# Patient Record
Sex: MALE | Race: WHITE | NOT HISPANIC OR LATINO | Employment: OTHER | URBAN - METROPOLITAN AREA
[De-identification: names, ages, dates, MRNs, and addresses within clinical notes are randomized per-mention and may not be internally consistent; named-entity substitution may affect disease eponyms.]

---

## 2021-03-18 ENCOUNTER — OFFICE VISIT (OUTPATIENT)
Dept: NEUROLOGY | Facility: CLINIC | Age: 46
End: 2021-03-18
Payer: MEDICARE

## 2021-03-18 VITALS
RESPIRATION RATE: 16 BRPM | SYSTOLIC BLOOD PRESSURE: 132 MMHG | WEIGHT: 153.25 LBS | BODY MASS INDEX: 24.63 KG/M2 | HEIGHT: 66 IN | HEART RATE: 84 BPM | DIASTOLIC BLOOD PRESSURE: 84 MMHG | TEMPERATURE: 99 F

## 2021-03-18 DIAGNOSIS — G40.909 SEIZURE DISORDER: Primary | ICD-10-CM

## 2021-03-18 DIAGNOSIS — F31.9 BIPOLAR AFFECTIVE DISORDER, REMISSION STATUS UNSPECIFIED: ICD-10-CM

## 2021-03-18 DIAGNOSIS — Q04.6 PORENCEPHALIC CYST: ICD-10-CM

## 2021-03-18 DIAGNOSIS — F81.9 COGNITIVE DEVELOPMENTAL DELAY: ICD-10-CM

## 2021-03-18 PROCEDURE — 99204 OFFICE O/P NEW MOD 45 MIN: CPT | Mod: S$PBB | Performed by: PSYCHIATRY & NEUROLOGY

## 2021-03-18 PROCEDURE — 99999 PR STA SHADOW: CPT | Mod: PBBFAC,,, | Performed by: PSYCHIATRY & NEUROLOGY

## 2021-03-18 PROCEDURE — 99999 PR PBB SHADOW E&M-NEW PATIENT-LVL III: ICD-10-PCS | Mod: PBBFAC,,, | Performed by: PSYCHIATRY & NEUROLOGY

## 2021-03-18 PROCEDURE — 99203 OFFICE O/P NEW LOW 30 MIN: CPT | Mod: PBBFAC | Performed by: PSYCHIATRY & NEUROLOGY

## 2021-03-18 PROCEDURE — 99999 PR PBB SHADOW E&M-NEW PATIENT-LVL III: CPT | Mod: PBBFAC,,, | Performed by: PSYCHIATRY & NEUROLOGY

## 2021-03-18 RX ORDER — LORATADINE 10 MG/1
10 TABLET ORAL DAILY PRN
COMMUNITY
End: 2022-09-08

## 2021-03-18 RX ORDER — OLMESARTAN MEDOXOMIL 20 MG/1
20 TABLET ORAL DAILY
COMMUNITY
End: 2022-09-08 | Stop reason: SDUPTHER

## 2021-03-18 RX ORDER — PANTOPRAZOLE SODIUM 40 MG/1
40 TABLET, DELAYED RELEASE ORAL DAILY
COMMUNITY
End: 2022-09-08

## 2021-03-18 RX ORDER — FLUTICASONE PROPIONATE 50 MCG
1 SPRAY, SUSPENSION (ML) NASAL DAILY PRN
COMMUNITY
End: 2022-09-08

## 2021-03-18 RX ORDER — OLANZAPINE 7.5 MG/1
7.5 TABLET ORAL NIGHTLY
COMMUNITY

## 2021-03-18 RX ORDER — PROPRANOLOL HYDROCHLORIDE 10 MG/1
10 TABLET ORAL 2 TIMES DAILY
COMMUNITY

## 2021-03-18 RX ORDER — DIVALPROEX SODIUM 250 MG/1
250 TABLET, FILM COATED, EXTENDED RELEASE ORAL EVERY 12 HOURS
Qty: 180 TABLET | Refills: 3 | Status: SHIPPED | OUTPATIENT
Start: 2021-03-18 | End: 2022-09-08 | Stop reason: SDUPTHER

## 2021-03-18 RX ORDER — ONDANSETRON 4 MG/1
4 TABLET, ORALLY DISINTEGRATING ORAL EVERY 6 HOURS PRN
COMMUNITY
End: 2022-09-08

## 2022-08-09 ENCOUNTER — TELEPHONE (OUTPATIENT)
Dept: NEUROLOGY | Facility: CLINIC | Age: 47
End: 2022-08-09
Payer: MEDICARE

## 2022-08-09 NOTE — TELEPHONE ENCOUNTER
Patient has been scheduled with DAIN Azevedo for Sept 8,2022 and caregiver has verbalize understanding.

## 2022-08-09 NOTE — TELEPHONE ENCOUNTER
----- Message from Queenie Sands sent at 8/9/2022  3:39 PM CDT -----  Contact: Xena [phone]  Type:  Sooner Appointment Request    Caller is requesting a sooner appointment.  Caller declined first available appointment listed below.  Caller will not accept being placed on the waitlist and is requesting a message be sent to doctor.    Name of Caller: Xena  When is the first available appointment? 3/1/23  Reason for Visit: Epilepsy  Would the patient rather a call back or a response via [facility]? Phone call  Best Call Back Number: [phone]  Additional Information: Patient is temporarily staying with [facility] and looking to continue his neurology care with a provider in [place]. OK with either [facility] or [facility].

## 2022-08-09 NOTE — TELEPHONE ENCOUNTER
----- Message from Queenie Sands sent at 8/9/2022  3:39 PM CDT -----  Contact: Xena [phone]  Type:  Sooner Appointment Request    Caller is requesting a sooner appointment.  Caller declined first available appointment listed below.  Caller will not accept being placed on the waitlist and is requesting a message be sent to doctor.    Name of Caller: Xena  When is the first available appointment? 3/1/23  Reason for Visit: Epilepsy  Would the patient rather a call back or a response via [facility]? Phone call  Best Call Back Number: [phone]  Additional Information: Patient is temporarily staying with [facility] and looking to continue his neurology care with a provider in [place]. OK with either [facility] or [facility].

## 2022-09-07 NOTE — PROGRESS NOTES
Subjective:      Patient ID: Claude Anthony Sarchet is a 46 y.o. male.    Chief Complaint:  Seizures (//)      History of Present Illness  HPI    Former patient of Dr. Sanders, new to me.    Patient presents to appointment with his caretaker, Xena. Patient moved to a [facility] group home in [place] in February 2022. The purpose of this appointment is to established care with a neurologist in [place] for epilepsy while he is staying here temporarily. Xena knows very limited information on the patient's medical history; therefore, the phillip of the HPI is from EMR. Patient's medical history includes cognitive developmental delay, seizure disorder, porencephalic cyst, mood disorder, bipolar, and pancreatitis in 2015. Patient's baseline is oriented to self. He is able to completed ADLs with assistance. It is unclear when patient had his first or last seizure. Per chart review, patient had a possible seizure on 4- described as a 30 minute unresponsive episode. Per Dr. Sanders's note, patient has been seizure free for years. Xena states she is unaware of patient having a seizure since moving into the group home. He is currently taking Depakote  mg BID for epilepsy and behavioral problems. She denies him having adverse effects or tremors. Sees psych for mood disorder and bipolar. Does not drink alcohol. No known history of head injury or stroke. On 4-, MRI brain shows central white matter loss with prominence of the ventricular system. The anterior horn of the left ventricle enlarges into a porencephalic cyst, unchanged compared to head CT 8/2013. Saw neurosurgery who did not recommend surgical intervention. 3-3-2021 VPA 22L.      Review of Systems  Review of Systems   Unable to perform ROS: Psychiatric disorder   Objective:   VITAL SIGNS WERE REVIEWED        GENERAL APPEARANCE:      The patient looks comfortable.     BMI 19.91     No signs of respiratory distress.     Normal  breathing pattern.     No dysmorphic features     No eye contact.      GENERAL MEDICAL EXAM:     HEENT:  Head is atraumatic normocephalic.      Neck and Axillae: No JVD. No visible lesions. No thyromegaly. No lymphadenopathy.     Cardiopulmonary: No cyanosis. No tachypnea. Normal respiratory effort.     Gastrointestinal/Urogenital:  No jaundice. No stomas or lesions. No visible hernias. No catheters.     Skin, Hair and Nails:  No neurofibromatosis. No visible lesions.No stigmata of autoimmune disease. No clubbing.     Skin is warm and moist. No palpable masses.     Limbs: No varicose veins. No visible swelling. No palpable edema.     Muskoskeletal: No visible deformities.No visible lesions.     No spine tenderness. No signs of longstanding neuropathy. No dislocations or fractures.          Neurologic Exam     Mental Status   Disoriented to person: oriented first name only.   Disoriented to place.   Disoriented to time.   Follows 1 step commands.   Attention: decreased. Concentration: decreased.   Speech: speech is normal   Level of consciousness: alert    Cranial Nerves     CN II   Visual acuity: decreased    CN III, IV, VI   Pupils are equal, round, and reactive to light.  Right pupil: Size: 3 mm. Shape: regular. Reactivity: brisk. Consensual response: intact. Accommodation: intact.   Left pupil: Size: 3 mm. Shape: regular. Reactivity: brisk. Consensual response: intact. Accommodation: intact.   CN III: no CN III palsy  CN VI: no CN VI palsy  Nystagmus: none   Diplopia: none  Abnormal upgaze: Right eye upward deviation.  Downgaze: normal    CN V   Facial sensation intact.   Right facial sensation deficit: none  Left facial sensation deficit: none    CN VII   Facial expression full, symmetric.   Right facial weakness: none  Left facial weakness: none    CN VIII   CN VIII normal.   Hearing: intact    CN IX, X   CN IX normal.   CN X normal.   Palate: symmetric    CN XI   CN XI normal.   Right sternocleidomastoid  strength: normal  Left sternocleidomastoid strength: normal  Right trapezius strength: normal  Left trapezius strength: normal    CN XII   CN XII normal.   Tongue: not atrophic  Fasciculations: absent  Tongue deviation: none    Motor Exam   Muscle bulk: normal  Overall muscle tone: normal  Right arm tone: normal  Left arm tone: normal  Right arm pronator drift: absent  Left arm pronator drift: absent  Right leg tone: normal  Left leg tone: normal  Had difficulty following commands. Appears to move all extremities equally and strong      Sensory Exam     Unable to assess     Gait, Coordination, and Reflexes     Gait  Gait: normal    Coordination   Finger to nose coordination: normal    Tremor   Resting tremor: absent  Intention tremor: absent  Action tremor: left arm and right arm    Reflexes   Right brachioradialis: 2+  Left brachioradialis: 2+  Right biceps: 2+  Left biceps: 2+  Right triceps: 2+  Left triceps: 2+  Right patellar: 2+  Left patellar: 2+  Right achilles: 2+  Left achilles: 2+  Right plantar: normal  Left plantar: normal  Right Bright: absent  Left Bright: absent  Right ankle clonus: absent  Left ankle clonus: absent  Right pendular knee jerk: absent  Left pendular knee jerk: absent        Lab Results   Component Value Date    WBC 6.42 04/13/2016    HGB 13.4 (L) 04/13/2016    HCT 40.5 04/13/2016    MCV 91 04/13/2016     (L) 04/13/2016       Sodium   Date Value Ref Range Status   04/13/2016 142 136 - 145 mmol/L Final     Potassium   Date Value Ref Range Status   04/13/2016 3.8 3.5 - 5.1 mmol/L Final     Chloride   Date Value Ref Range Status   04/13/2016 108 95 - 110 mmol/L Final     CO2   Date Value Ref Range Status   04/13/2016 27 23 - 29 mmol/L Final     Glucose   Date Value Ref Range Status   04/13/2016 91 70 - 110 mg/dL Final     BUN   Date Value Ref Range Status   04/13/2016 17 6 - 20 mg/dL Final     Creatinine   Date Value Ref Range Status   04/13/2016 1.0 0.5 - 1.4 mg/dL Final      Calcium   Date Value Ref Range Status   04/13/2016 9.6 8.7 - 10.5 mg/dL Final     Total Protein   Date Value Ref Range Status   04/12/2016 6.9 6.0 - 8.4 g/dL Final     Albumin   Date Value Ref Range Status   04/12/2016 4.3 3.5 - 5.2 g/dL Final     Total Bilirubin   Date Value Ref Range Status   04/12/2016 0.5 0.1 - 1.0 mg/dL Final     Comment:     For infants and newborns, interpretation of results should be based  on gestational age, weight and in agreement with clinical  observations.  Premature Infant recommended reference ranges:  Up to 24 hours.............<8.0 mg/dL  Up to 48 hours............<12.0 mg/dL  3-5 days..................<15.0 mg/dL  6-29 days.................<15.0 mg/dL       Alkaline Phosphatase   Date Value Ref Range Status   04/12/2016 60 55 - 135 U/L Final     AST   Date Value Ref Range Status   04/12/2016 22 10 - 40 U/L Final     ALT   Date Value Ref Range Status   04/12/2016 25 10 - 44 U/L Final     Anion Gap   Date Value Ref Range Status   04/13/2016 7 (L) 8 - 16 mmol/L Final     eGFR if    Date Value Ref Range Status   04/13/2016 >60.0 >60 mL/min/1.73 m^2 Final     eGFR if non    Date Value Ref Range Status   04/13/2016 >60.0 >60 mL/min/1.73 m^2 Final     Comment:     Calculation used to obtain the estimated glomerular filtration  rate (eGFR) is the CKD-EPI equation. Since race is unknown   in our information system, the eGFR values for   -American and Non--American patients are given   for each creatinine result.       AED MONITORING    AED: Valproic acid RANGE:  Dose    DATE LEVEL    3-3-2021 22.7L 250 mg BID ER                                      08-    CTH shows a cystic structure emanating off the anterior horn of the left lateral ventricle. There is ventricular dilation. Otherwise, this is a normal CT of the brain    04-    MRI brain shows central white matter loss with prominence of the ventricular system. The  anterior horn of the left ventricle enlarges into a porencephalic cyst, unchanged compared to head CT 8/2013 09-    EEG Non-epileptiform, Technically Difficult         Reviewed the neuroimaging independently      Assessment:     1. Seizure disorder    2. Porencephalic cyst    3. Cognitive developmental delay    4. Bipolar affective disorder, remission status unspecified    5. Mood disorder         EPILEPSY CLASSIFICATION        SEMIOLOGY: UNKOWN     EPILEPTOGENIC ZONE (S): UNKNOWN      ETIOLOGY: PORENCEPHALIC CYST     PRIOR AEDS:  UNKOWN    CURRENT AEDS: VPA     LAST SEIZURE DATE: UNKNOWN        COMPREHENSIVE LIST OF AEDs:      Acetazolamide (AZM-Diamox)   Benzos: clonazepam (CZP Klonopin), lorazepam (LZP-Ativan), diazepam (DZP-Valium), clorazepate (CLZ- Tranxene)  Brivaracetam (BRV-Briviact)  Cannabidiol (CBD- Epidiolex)  Carbamazepine (CBZ-Tegretol)  Cenobamate (CNB-Xcopri)  Clobazam (CLB-Onfi)  Eslicarbazepine (ESL-Aptiom)  Ethosuximide (ESX-Zarontin)  Felbamate (FBM-Felbatol)  Gabapentin (GBP-Neurontin)  Lacosamide (LCM-Vimpat)  Lamotrigine (LTG-Lamitcal)  Levetiracetam (LEV- Keppra)  Oxcarbazepine (OXC-Trileptal)  Perampanel (PML-Fycompa)  Phenobarbital (PB)  Phenytoin (PHT-Dilantin)  Pregabalin (PGB-Lyrica)  Primidone (PRM)   Retigabine (RTG- Potiga) Discontinued in 2017  Rufinamide (RFN-Benzil)  Stiripentol (STP-Diacomit)  Tiagabine (TGB-Gabitril)  Topiramate (TPM-Topamax)  Valproate (VPA-Depakote)  Vigabatrin (VGB-Sabril)  Zonisamide (ZNS-Zonegran)  Plan:     SEIZURE DISORDER    EEG to evaluate for epileptiform discharges.     Brain MRI    The patient was encouraged to maintain full traditional seizure precautions which include but not limited to avoid driving, avoid high altitudes, avoid being close to fire or fire source, avoid being close to a body of water or swimming alone, avoid operating heavy machinery and avoid using sharp objects if possible. The patient was encouraged to shower  (without accumulation of water) instead of taking a bath if unsupervised. The patient was made aware that these precautions are especially important during concurrent illness. Adequate sleep and avoidance of alcohol as important measures to assure good seizure control were discussed with the patient. The patient was also advised not to care for children without company. The patient was advised to pad the side rails with pillows and blankets if applicable.I strongly recommended lowering the bed to the floor level to decrease the risk of falls.     Continue VPA  mg BID and check level    Check CBC and CMP    Continue AEDs INDEFINITELY, FOR GOOD AND NEVER SKIP A DOSE. The patient verbalized full understanding. Stressed the extreme medical, personal safety, public safety and legal importance of compliance and seizure control. Will give as many refills as possible with or without face-to-face evaluation to make sure the patient and any patient with epilepsy will never run out of medications. Running out of seizure medications should never happen under our care. I explained to the patient that he should not, under any circumstances, adjust or stop taking his seizure medication without discussing with me. The patient verbalized full understanding.       Explained to the patient that if 2 AEDs fail to control the seizures the likelihood of AEDs alone to control the seizures is <10% and other other options should be pursued.    May consider Adjuvant Verapamil and Prednisone in medically-refractory epilepsy.         AVOID any substance that could lower seizure threshold including but not limited to:      ALCOHOL AND WITHDRAWAL      TRAMADOL.     DEMEROL      ALL STIMULANTS-ALL ADHD MEDICATIONS.      CLOZAPINE.      BUPROPION.     CIPROFLOXACIN          SEIZURE FREEDOM DEFINITION: No seizures for 1 year or for 3 times the interval between the last 2 seizures whichever is longer (Some studies suggest 6 times  even)!          PORENCEPHALIC CYST    Evaluate MRI brain for monitoring        BIPLOAR/MOOD DISORDER    Continue to follow psych         MEDICAL/SURGICAL COMORBIDITIES      All relevant medical comorbidities noted and managed by primary care physician and medical care team.          MISCELLANEOUS MEDICAL PROBLEMS         HEALTHY LIFESTYLE AND PREVENTATIVE CARE     Encouraged the patient to adhere to the age-appropriate health maintenance guidelines including screening tests and vaccinations.      Discussed the overall importance of healthy lifestyle, optimal weight, exercise, healthy diet, good sleep hygiene and avoiding drugs including smoking, alcohol and recreational drugs. The patient verbalized full understanding.         Advised the patient to follow COVID-19 prevention measures.         I spent a total of 70 minutes on the day of the visit.  This includes face to face time and non-face to face time preparing to see the patient (eg, review of tests), obtaining and/or reviewing separately obtained history, documenting clinical information in the electronic or other health record, independently interpreting results and communicating results to the patient/family/caregiver, or care coordinator.            RTC    Follow up in about 6 months (around 3/8/2023).     Jolly Tolliver, MSN, NP  Collaborating Provider: Jarrell Kincaid MD, FAAN Neurologist/Epileptologist

## 2022-09-08 ENCOUNTER — OFFICE VISIT (OUTPATIENT)
Dept: NEUROLOGY | Facility: CLINIC | Age: 47
End: 2022-09-08
Payer: MEDICARE

## 2022-09-08 VITALS
WEIGHT: 121.5 LBS | SYSTOLIC BLOOD PRESSURE: 126 MMHG | HEART RATE: 65 BPM | RESPIRATION RATE: 16 BRPM | BODY MASS INDEX: 19.53 KG/M2 | HEIGHT: 66 IN | DIASTOLIC BLOOD PRESSURE: 83 MMHG

## 2022-09-08 DIAGNOSIS — Q04.6 PORENCEPHALIC CYST: ICD-10-CM

## 2022-09-08 DIAGNOSIS — F31.9 BIPOLAR AFFECTIVE DISORDER, REMISSION STATUS UNSPECIFIED: ICD-10-CM

## 2022-09-08 DIAGNOSIS — G40.909 SEIZURE DISORDER: Primary | ICD-10-CM

## 2022-09-08 DIAGNOSIS — F81.9 COGNITIVE DEVELOPMENTAL DELAY: ICD-10-CM

## 2022-09-08 DIAGNOSIS — F39 MOOD DISORDER: ICD-10-CM

## 2022-09-08 PROCEDURE — 99999 PR PBB SHADOW E&M-EST. PATIENT-LVL IV: CPT | Mod: PBBFAC,,, | Performed by: NURSE PRACTITIONER

## 2022-09-08 PROCEDURE — 99215 OFFICE O/P EST HI 40 MIN: CPT | Mod: S$PBB,,, | Performed by: NURSE PRACTITIONER

## 2022-09-08 PROCEDURE — 99999 PR PBB SHADOW E&M-EST. PATIENT-LVL IV: ICD-10-PCS | Mod: PBBFAC,,, | Performed by: NURSE PRACTITIONER

## 2022-09-08 PROCEDURE — 99215 PR OFFICE/OUTPT VISIT, EST, LEVL V, 40-54 MIN: ICD-10-PCS | Mod: S$PBB,,, | Performed by: NURSE PRACTITIONER

## 2022-09-08 PROCEDURE — 99214 OFFICE O/P EST MOD 30 MIN: CPT | Mod: PBBFAC | Performed by: NURSE PRACTITIONER

## 2022-09-08 RX ORDER — DIVALPROEX SODIUM 250 MG/1
250 TABLET, DELAYED RELEASE ORAL 2 TIMES DAILY
COMMUNITY
Start: 2022-08-18 | End: 2022-09-08 | Stop reason: SDUPTHER

## 2022-09-08 RX ORDER — OLANZAPINE 7.5 MG/1
1 TABLET ORAL DAILY
COMMUNITY
Start: 2022-03-21 | End: 2022-09-08 | Stop reason: SDUPTHER

## 2022-09-08 RX ORDER — FENOFIBRATE 145 MG/1
1 TABLET, FILM COATED ORAL DAILY
COMMUNITY
Start: 2022-07-26

## 2022-09-08 RX ORDER — ACETAMINOPHEN 325 MG/1
325 TABLET ORAL
COMMUNITY

## 2022-09-08 RX ORDER — PROPRANOLOL HYDROCHLORIDE 10 MG/1
1 TABLET ORAL DAILY
COMMUNITY
Start: 2022-07-26 | End: 2022-09-08 | Stop reason: SDUPTHER

## 2022-09-08 RX ORDER — DIVALPROEX SODIUM 250 MG/1
250 TABLET, FILM COATED, EXTENDED RELEASE ORAL EVERY 12 HOURS
Qty: 180 TABLET | Refills: 3 | Status: SHIPPED | OUTPATIENT
Start: 2022-09-08 | End: 2023-07-14 | Stop reason: SDUPTHER

## 2022-09-08 RX ORDER — OLMESARTAN MEDOXOMIL 20 MG/1
1 TABLET ORAL DAILY
COMMUNITY
Start: 2022-07-26

## 2022-09-08 RX ORDER — ADHESIVE BANDAGE
30 BANDAGE TOPICAL
COMMUNITY

## 2022-09-14 LAB
ALBUMIN SERPL-MCNC: 4.1 G/DL (ref 4–5)
ALBUMIN/GLOB SERPL: 2.3 {RATIO} (ref 1.2–2.2)
ALP SERPL-CCNC: 56 IU/L (ref 44–121)
ALT SERPL-CCNC: 27 IU/L (ref 0–44)
AST SERPL-CCNC: 28 IU/L (ref 0–40)
BASOPHILS # BLD AUTO: 0 X10E3/UL (ref 0–0.2)
BASOPHILS NFR BLD AUTO: 0 %
BILIRUB SERPL-MCNC: 0.5 MG/DL (ref 0–1.2)
BUN SERPL-MCNC: 16 MG/DL (ref 6–24)
BUN/CREAT SERPL: 17 (ref 9–20)
CALCIUM SERPL-MCNC: 9.6 MG/DL (ref 8.7–10.2)
CHLORIDE SERPL-SCNC: 110 MMOL/L (ref 96–106)
CO2 SERPL-SCNC: 23 MMOL/L (ref 20–29)
CREAT SERPL-MCNC: 0.92 MG/DL (ref 0.76–1.27)
EOSINOPHIL # BLD AUTO: 0.1 X10E3/UL (ref 0–0.4)
EOSINOPHIL NFR BLD AUTO: 1 %
ERYTHROCYTE [DISTWIDTH] IN BLOOD BY AUTOMATED COUNT: 13.1 % (ref 11.6–15.4)
EST. GFR  (NO RACE VARIABLE): 105 ML/MIN/1.73
GLOBULIN SER CALC-MCNC: 1.8 G/DL (ref 1.5–4.5)
GLUCOSE SERPL-MCNC: 120 MG/DL (ref 65–99)
HCT VFR BLD AUTO: 34.9 % (ref 37.5–51)
HGB BLD-MCNC: 11.6 G/DL (ref 13–17.7)
IMM GRANULOCYTES # BLD AUTO: 0 X10E3/UL (ref 0–0.1)
IMM GRANULOCYTES NFR BLD AUTO: 1 %
LYMPHOCYTES # BLD AUTO: 1.2 X10E3/UL (ref 0.7–3.1)
LYMPHOCYTES NFR BLD AUTO: 33 %
MCH RBC QN AUTO: 30.2 PG (ref 26.6–33)
MCHC RBC AUTO-ENTMCNC: 33.2 G/DL (ref 31.5–35.7)
MCV RBC AUTO: 91 FL (ref 79–97)
MONOCYTES # BLD AUTO: 0.2 X10E3/UL (ref 0.1–0.9)
MONOCYTES NFR BLD AUTO: 6 %
NEUTROPHILS # BLD AUTO: 2.1 X10E3/UL (ref 1.4–7)
NEUTROPHILS NFR BLD AUTO: 59 %
PLATELET # BLD AUTO: 182 X10E3/UL (ref 150–450)
POTASSIUM SERPL-SCNC: 3.8 MMOL/L (ref 3.5–5.2)
PROT SERPL-MCNC: 5.9 G/DL (ref 6–8.5)
RBC # BLD AUTO: 3.84 X10E6/UL (ref 4.14–5.8)
SODIUM SERPL-SCNC: 147 MMOL/L (ref 134–144)
VALPROATE SERPL-MCNC: 50 UG/ML (ref 50–100)
WBC # BLD AUTO: 3.5 X10E3/UL (ref 3.4–10.8)

## 2022-09-28 ENCOUNTER — HOSPITAL ENCOUNTER (OUTPATIENT)
Dept: PULMONOLOGY | Facility: HOSPITAL | Age: 47
Discharge: HOME | End: 2022-09-28
Attending: STUDENT IN AN ORGANIZED HEALTH CARE EDUCATION/TRAINING PROGRAM
Payer: MEDICARE

## 2022-09-28 DIAGNOSIS — G40.909 SEIZURE DISORDER: ICD-10-CM

## 2022-09-28 PROCEDURE — 95819 PR EEG,W/AWAKE & ASLEEP RECORD: ICD-10-PCS | Mod: 26,,, | Performed by: PSYCHIATRY & NEUROLOGY

## 2022-09-28 PROCEDURE — 95816 EEG AWAKE AND DROWSY: CPT

## 2022-09-28 PROCEDURE — 95819 EEG AWAKE AND ASLEEP: CPT | Mod: 26,,, | Performed by: PSYCHIATRY & NEUROLOGY

## 2022-09-28 NOTE — PROCEDURES
DATE EEG PERFORMED:  2022.      DATE EEG INTERPRETED: 2022.                     DURATION OF EE MINUTES.        LEVEL OF CONSCIOUSENESS    Awake and Sleep.         EEG BACKGROUND    The posterior dominant basic rhythm cannot be evaluated.    Abundant EMG and eye movement artifacts.         EEG CLASSIFICATION    Technically Difficult         IMPRESSION      The EEG is technically difficult.       There are no epileptiform discharges or lateralizing signs. No typical events were recorded. There is no electrographic evidence of seizure.There is no electrographic evidence of status epilepticus.         PLEASE NOTE THAT A NON-EPILEPTIFORM EEG DOES NOT RULE OUT EPILEPSY.        JORDEN BARRAZA MD, FAAN    Diplomate, American Board of Psychiatry and Neurology    Diplomate, American Board of Clinical Neurophysiology

## 2022-09-29 ENCOUNTER — TELEPHONE (OUTPATIENT)
Dept: NEUROLOGY | Facility: CLINIC | Age: 47
End: 2022-09-29
Payer: MEDICARE

## 2022-09-29 NOTE — PROGRESS NOTES
EEG Reviewed    9-    The EEG is technically difficult. Abundant movement artifact. No epileptiform discharges seen.

## 2022-10-04 ENCOUNTER — HOSPITAL ENCOUNTER (OUTPATIENT)
Dept: RADIOLOGY | Facility: HOSPITAL | Age: 47
Discharge: HOME | End: 2022-10-04
Attending: NURSE PRACTITIONER
Payer: MEDICARE

## 2022-10-04 DIAGNOSIS — G40.909 SEIZURE DISORDER: ICD-10-CM

## 2022-10-04 DIAGNOSIS — Q04.6 PORENCEPHALIC CYST: ICD-10-CM

## 2022-11-30 ENCOUNTER — HOSPITAL ENCOUNTER (OUTPATIENT)
Dept: RADIOLOGY | Facility: HOSPITAL | Age: 47
Discharge: HOME | End: 2022-11-30
Attending: NURSE PRACTITIONER
Payer: MEDICARE

## 2023-02-17 ENCOUNTER — TELEPHONE (OUTPATIENT)
Dept: NEUROLOGY | Facility: CLINIC | Age: 48
End: 2023-02-17
Payer: MEDICARE

## 2023-02-22 ENCOUNTER — TELEPHONE (OUTPATIENT)
Dept: NEUROLOGY | Facility: CLINIC | Age: 48
End: 2023-02-22
Payer: MEDICARE

## 2023-05-09 ENCOUNTER — OFFICE VISIT (OUTPATIENT)
Dept: NEUROLOGY | Facility: CLINIC | Age: 48
End: 2023-05-09
Payer: MEDICARE

## 2023-05-09 ENCOUNTER — LAB VISIT (OUTPATIENT)
Dept: LAB | Facility: HOSPITAL | Age: 48
End: 2023-05-09
Attending: PHYSICIAN ASSISTANT
Payer: MEDICARE

## 2023-05-09 VITALS
SYSTOLIC BLOOD PRESSURE: 112 MMHG | BODY MASS INDEX: 19.98 KG/M2 | WEIGHT: 124.31 LBS | DIASTOLIC BLOOD PRESSURE: 70 MMHG | HEIGHT: 66 IN | RESPIRATION RATE: 16 BRPM | HEART RATE: 73 BPM

## 2023-05-09 DIAGNOSIS — F79 MENTALLY CHALLENGED: ICD-10-CM

## 2023-05-09 DIAGNOSIS — Q04.6 PORENCEPHALIC CYST: ICD-10-CM

## 2023-05-09 DIAGNOSIS — G40.909 SEIZURE DISORDER: ICD-10-CM

## 2023-05-09 DIAGNOSIS — G40.909 SEIZURE DISORDER: Primary | ICD-10-CM

## 2023-05-09 DIAGNOSIS — F81.9 COGNITIVE DEVELOPMENTAL DELAY: ICD-10-CM

## 2023-05-09 DIAGNOSIS — Z86.73 HISTORY OF CVA IN ADULTHOOD: ICD-10-CM

## 2023-05-09 DIAGNOSIS — F31.9 BIPOLAR AFFECTIVE DISORDER, REMISSION STATUS UNSPECIFIED: ICD-10-CM

## 2023-05-09 DIAGNOSIS — F39 MOOD DISORDER: ICD-10-CM

## 2023-05-09 LAB — VALPROATE SERPL-MCNC: 23 UG/ML (ref 50–100)

## 2023-05-09 PROCEDURE — 99215 OFFICE O/P EST HI 40 MIN: CPT | Mod: S$PBB,,, | Performed by: NURSE PRACTITIONER

## 2023-05-09 PROCEDURE — 99999 PR PBB SHADOW E&M-EST. PATIENT-LVL IV: ICD-10-PCS | Mod: PBBFAC,,, | Performed by: NURSE PRACTITIONER

## 2023-05-09 PROCEDURE — 99999 PR PBB SHADOW E&M-EST. PATIENT-LVL IV: CPT | Mod: PBBFAC,,, | Performed by: NURSE PRACTITIONER

## 2023-05-09 PROCEDURE — 80164 ASSAY DIPROPYLACETIC ACD TOT: CPT | Performed by: NURSE PRACTITIONER

## 2023-05-09 PROCEDURE — 99215 PR OFFICE/OUTPT VISIT, EST, LEVL V, 40-54 MIN: ICD-10-PCS | Mod: S$PBB,,, | Performed by: NURSE PRACTITIONER

## 2023-05-09 PROCEDURE — 99214 OFFICE O/P EST MOD 30 MIN: CPT | Mod: PBBFAC | Performed by: NURSE PRACTITIONER

## 2023-05-09 PROCEDURE — 36415 COLL VENOUS BLD VENIPUNCTURE: CPT | Performed by: NURSE PRACTITIONER

## 2023-05-09 NOTE — PROGRESS NOTES
Subjective:      Patient ID: Claude Anthony Sarchet is a 48 y.o. male.    Chief Complaint:  Seizure disorder        History of Present Illness  HPI    Former patient of Dr. Sanders, new to me.    Patient presents to appointment with his caretaker, Xena. Patient moved to a [facility] group home in [place] in February 2022. The purpose of this appointment is to established care with a neurologist in [place] for epilepsy while he is staying here temporarily. Xena knows very limited information on the patient's medical history; therefore, the phillip of the HPI is from EMR. Patient's medical history includes cognitive developmental delay, seizure disorder, porencephalic cyst, mood disorder, bipolar, and pancreatitis in 2015. Patient's baseline is oriented to self. He is able to completed ADLs with assistance. It is unclear when patient had his first or last seizure. Per chart review, patient had a possible seizure on 4- described as a 30 minute unresponsive episode. Per Dr. Sanders's note, patient has been seizure free for years. Xena states she is unaware of patient having a seizure since moving into the group home. He is currently taking Depakote  mg BID for epilepsy and behavioral problems. She denies him having adverse effects or tremors. Sees psych for mood disorder and bipolar. Does not drink alcohol. No known history of head injury or stroke. On 4-, MRI brain shows central white matter loss with prominence of the ventricular system. The anterior horn of the left ventricle enlarges into a porencephalic cyst, unchanged compared to head CT 8/2013. Saw neurosurgery who did not recommend surgical intervention. 3-3-2021 VPA 22L.      INTERVAL  HISTORY 05-: Patient is new to me, known to Jolly Tolliver NP. Patient is currently Carries Dx of Seizures, Cognitive developmental delay, Porencephalic cyst, Mood disorder, Bipolar.  Accompanied by caretaker, Viral.  Patient continues to be a resident of [facility]. Patient doing well.  Per chart review, patient had a possible seizure on 4- described as a 30 minute unresponsive episode. There is no mention of repeat occurrences. Viral reports no recent seizures. He has been a resident for the past 2 years with no issues or seizures reported.   He is currently taking Depakote  mg BID for epilepsy and behavioral problems. Patient also take Zyprexa 7.5 mg po daily managed by PCP Dr. Kirkland.     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 He states many times he want to go back to work. He states he enjoy watching TV at work, very kind, very repetitive, independent with dressing himself, he has to have supervision with ADLs such as bathing. The patient able to feed himself, he does light house work, simple instructions understood.      MRI Brain not completed, will order VPA level. will recheck EEG .       Review of Systems  Review of Systems   Unable to perform ROS: Psychiatric disorder     Objective:   VITAL SIGNS WERE REVIEWED        GENERAL APPEARANCE:      The patient looks comfortable.     BMI 19.91     No signs of respiratory distress.     Normal breathing pattern.     No dysmorphic features     No eye contact.      GENERAL MEDICAL EXAM:     HEENT:  Head is atraumatic normocephalic.      Neck and Axillae: No JVD. No visible lesions. No thyromegaly. No lymphadenopathy.     Cardiopulmonary: No cyanosis. No tachypnea. Normal respiratory effort.     Gastrointestinal/Urogenital:  No jaundice. No stomas or lesions. No visible hernias. No catheters.     Skin, Hair and Nails:  No neurofibromatosis. No visible lesions.No stigmata of autoimmune disease. No clubbing.     Skin is warm and moist. No palpable masses.     Limbs: No varicose veins. No visible swelling. No palpable edema.     Muskoskeletal: No visible deformities.No visible lesions.     No spine tenderness. No signs  of longstanding neuropathy. No dislocations or fractures.          Neurologic Exam     Mental Status   Disoriented to person: oriented first name only.   Disoriented to place.   Disoriented to time.   Follows 1 step commands.   Attention: decreased. Concentration: decreased.   Speech: speech is normal   Level of consciousness: alert    Cranial Nerves     CN II   Visual acuity: decreased    CN III, IV, VI   Pupils are equal, round, and reactive to light.  Right pupil: Size: 3 mm. Shape: regular. Reactivity: brisk. Consensual response: intact. Accommodation: intact.   Left pupil: Size: 3 mm. Shape: regular. Reactivity: brisk. Consensual response: intact. Accommodation: intact.   CN III: no CN III palsy  CN VI: no CN VI palsy  Nystagmus: none   Diplopia: none  Abnormal upgaze: Right eye upward deviation.  Downgaze: normal    CN V   Facial sensation intact.   Right facial sensation deficit: none  Left facial sensation deficit: none    CN VII   Facial expression full, symmetric.   Right facial weakness: none  Left facial weakness: none    CN VIII   CN VIII normal.   Hearing: intact    CN IX, X   CN IX normal.   CN X normal.   Palate: symmetric    CN XI   CN XI normal.   Right sternocleidomastoid strength: normal  Left sternocleidomastoid strength: normal  Right trapezius strength: normal  Left trapezius strength: normal    CN XII   CN XII normal.   Tongue: not atrophic  Fasciculations: absent  Tongue deviation: none    Motor Exam   Muscle bulk: normal  Overall muscle tone: normal  Right arm tone: normal  Left arm tone: normal  Right arm pronator drift: absent  Left arm pronator drift: absent  Right leg tone: normal  Left leg tone: normal  Had difficulty following commands. Appears to move all extremities equally and strong      Sensory Exam     Unable to assess     Gait, Coordination, and Reflexes     Gait  Gait: normal    Coordination   Finger to nose coordination: normal    Tremor   Resting tremor: absent  Intention  tremor: absent  Action tremor: left arm and right arm    Reflexes   Right brachioradialis: 2+  Left brachioradialis: 2+  Right biceps: 2+  Left biceps: 2+  Right triceps: 2+  Left triceps: 2+  Right patellar: 2+  Left patellar: 2+  Right achilles: 2+  Left achilles: 2+  Right plantar: normal  Left plantar: normal  Right Bright: absent  Left Bright: absent  Right ankle clonus: absent  Left ankle clonus: absent  Right pendular knee jerk: absent  Left pendular knee jerk: absent          Lab Results   Component Value Date    WBC 4.6 02/16/2023    HGB 11.0 (L) 02/16/2023    HCT 33.8 (L) 02/16/2023    MCV 91 09/13/2022     02/16/2023       Sodium   Date Value Ref Range Status   09/13/2022 147 (H) 134 - 144 mmol/L Final     Potassium   Date Value Ref Range Status   09/13/2022 3.8 3.5 - 5.2 mmol/L Final     Chloride   Date Value Ref Range Status   09/13/2022 110 (H) 96 - 106 mmol/L Final     CO2   Date Value Ref Range Status   09/13/2022 23 20 - 29 mmol/L Final     Glucose   Date Value Ref Range Status   09/13/2022 120 (H) 65 - 99 mg/dL Final     BUN   Date Value Ref Range Status   09/13/2022 16 6 - 24 mg/dL Final     Creatinine   Date Value Ref Range Status   09/13/2022 0.92 0.76 - 1.27 mg/dL Final     Calcium   Date Value Ref Range Status   09/13/2022 9.6 8.7 - 10.2 mg/dL Final     Total Protein   Date Value Ref Range Status   04/12/2016 6.9 6.0 - 8.4 g/dL Final     Albumin   Date Value Ref Range Status   09/13/2022 4.1 4.0 - 5.0 g/dL Final   04/12/2016 4.3 3.5 - 5.2 g/dL Final     Total Bilirubin   Date Value Ref Range Status   09/13/2022 0.5 0.0 - 1.2 mg/dL Final     Alkaline Phosphatase   Date Value Ref Range Status   04/12/2016 60 55 - 135 U/L Final     AST   Date Value Ref Range Status   09/13/2022 28 0 - 40 IU/L Final     ALT   Date Value Ref Range Status   09/13/2022 27 0 - 44 IU/L Final     Anion Gap   Date Value Ref Range Status   04/13/2016 7 (L) 8 - 16 mmol/L Final     eGFR if    Date  Value Ref Range Status   04/13/2016 >60.0 >60 mL/min/1.73 m^2 Final     eGFR if non    Date Value Ref Range Status   04/13/2016 >60.0 >60 mL/min/1.73 m^2 Final     Comment:     Calculation used to obtain the estimated glomerular filtration  rate (eGFR) is the CKD-EPI equation. Since race is unknown   in our information system, the eGFR values for   -American and Non--American patients are given   for each creatinine result.       AED MONITORING    AED: Valproic acid RANGE:  Dose    DATE LEVEL    3-3-2021 22.7L 250 mg BID ER   05- 23.0 L  250 mg BID ER                                 08-    CTH shows a cystic structure emanating off the anterior horn of the left lateral ventricle. There is ventricular dilation. Otherwise, this is a normal CT of the brain    04-    MRI brain shows central white matter loss with prominence of the ventricular system. The anterior horn of the left ventricle enlarges into a porencephalic cyst, unchanged compared to head CT 8/2013 09-    EEG Non-epileptiform, Technically Difficult     CTH WO:     01-    No acute intracranial abnormality.     [place] left basal ganglia lacunar infarction.    Reviewed the neuroimaging independently      Assessment:     1. Seizure disorder    2. Cognitive developmental delay    3. Mentally challenged    4. Porencephalic cyst    5. Bipolar affective disorder, remission status unspecified    6. Mood disorder    7. HISTORY OF LEFT BASAL GANGLIA LACUNAR STROKE           EPILEPSY CLASSIFICATION        SEMIOLOGY: UNKOWN     EPILEPTOGENIC ZONE (S): UNKNOWN      ETIOLOGY: PORENCEPHALIC CYST     PRIOR AEDS:  UNKOWN    CURRENT AEDS: VPA     LAST SEIZURE DATE: UNKNOWN        COMPREHENSIVE LIST OF AEDs:      Acetazolamide (AZM-Diamox)   Benzos: clonazepam (CZP Klonopin), lorazepam (LZP-Ativan), diazepam (DZP-Valium), clorazepate (CLZ- Tranxene)  Brivaracetam (BRV-Briviact)  Cannabidiol (CBD-  Epidiolex)  Carbamazepine (CBZ-Tegretol)  Cenobamate (CNB-Xcopri)  Clobazam (CLB-Onfi)  Eslicarbazepine (ESL-Aptiom)  Ethosuximide (ESX-Zarontin)  Felbamate (FBM-Felbatol)  Gabapentin (GBP-Neurontin)  Lacosamide (LCM-Vimpat)  Lamotrigine (LTG-Lamitcal)  Levetiracetam (LEV- Keppra)  Oxcarbazepine (OXC-Trileptal)  Perampanel (PML-Fycompa)  Phenobarbital (PB)  Phenytoin (PHT-Dilantin)  Pregabalin (PGB-Lyrica)  Primidone (PRM)   Retigabine (RTG- Potiga) Discontinued in 2017  Rufinamide (RFN-Benzil)  Stiripentol (STP-Diacomit)  Tiagabine (TGB-Gabitril)  Topiramate (TPM-Topamax)  Valproate (VPA-Depakote)  Vigabatrin (VGB-Sabril)  Zonisamide (ZNS-Zonegran)  Plan:     SEIZURE DISORDER/  MENTALLY CHALLENGED WITH BEHAVIORS  Evaluate:     EEG to evaluate for epileptiform discharges.    MRI Brain WWO     The patient was encouraged to maintain full traditional seizure precautions which include but not limited to avoid driving, avoid high altitudes, avoid being close to fire or fire source, avoid being close to a body of water or swimming alone, avoid operating heavy machinery and avoid using sharp objects if possible. The patient was encouraged to shower (without accumulation of water) instead of taking a bath if unsupervised. The patient was made aware that these precautions are especially important during concurrent illness. Adequate sleep and avoidance of alcohol as important measures to assure good seizure control were discussed with the patient. The patient was also advised not to care for children without company. The patient was advised to pad the side rails with pillows and blankets if applicable.I strongly recommended lowering the bed to the floor level to decrease the risk of falls.     Continue VPA  mg BID and check level ( seizures and mood behaviors)     Check CBC and CMP    Continue AEDs INDEFINITELY, FOR GOOD AND NEVER SKIP A DOSE. The patient verbalized full understanding. Stressed the extreme medical,  personal safety, public safety and legal importance of compliance and seizure control. Will give as many refills as possible with or without face-to-face evaluation to make sure the patient and any patient with epilepsy will never run out of medications. Running out of seizure medications should never happen under our care. I explained to the patient that he should not, under any circumstances, adjust or stop taking his seizure medication without discussing with me. The patient verbalized full understanding.       Explained to the patient that if 2 AEDs fail to control the seizures the likelihood of AEDs alone to control the seizures is <10% and other other options should be pursued.    May consider Adjuvant Verapamil and Prednisone in medically-refractory epilepsy.         AVOID any substance that could lower seizure threshold including but not limited to:      ALCOHOL AND WITHDRAWAL      TRAMADOL.     DEMEROL      ALL STIMULANTS-ALL ADHD MEDICATIONS.      CLOZAPINE.      BUPROPION.     CIPROFLOXACIN          SEIZURE FREEDOM DEFINITION: No seizures for 1 year or for 3 times the interval between the last 2 seizures whichever is longer (Some studies suggest 6 times even)!          PORENCEPHALIC CYST    Evaluate MRI brain for monitoring        BIPLOAR/MOOD DISORDER    Continue to follow psych       HISTORY OF LEFT BASAL GANGLIA LACUNAR STROKE     Vascular Risk Factors (VRFs) stratification (BP, BS, BC control and Smoking Cessation) is the mainstay of stroke prevention (>90%). The benefit of antiplatelets is < 20% stroke risk reduction.         Healthy diet and exercise    Avoid driving.    Call 911 if any SUDDEN:   Weakness  Numbness  Slurring of speech  Speech difficulty   Vertigo  Loss of balance  Loss of vision  Loss of hearing  Double vision  Trouble swallowing  Trouble breathing  Facial drooping        Modified Slade Score (m-RS)      0  The patient has no residual symptoms.    1  The patient has no significant  disability; able to carry out all pre-stroke activities.    2  The patient has slight disability; unable to carry out all pre-stroke activities but able to look after self without daily help.    3  The patient has moderate disability; requiring some external help but able to walk without the assistance of another individual.    4  The patient has moderately severe disability; unable to walk or attend to bodily functions without assistance of another individual.    5  The patient has severe disability; bedridden, incontinent, requires continuous care.    6  The patient has  (during the hospital stay or after discharge from the hospital).      MEDICAL/SURGICAL COMORBIDITIES      All relevant medical comorbidities noted and managed by primary care physician and medical care team.          MISCELLANEOUS MEDICAL PROBLEMS         HEALTHY LIFESTYLE AND PREVENTATIVE CARE     Encouraged the patient to adhere to the age-appropriate health maintenance guidelines including screening tests and vaccinations.      Discussed the overall importance of healthy lifestyle, optimal weight, exercise, healthy diet, good sleep hygiene and avoiding drugs including smoking, alcohol and recreational drugs. The patient verbalized full understanding.         Advised the patient to follow COVID-19 prevention measures.         I spent a total of 54 minutes on the day of the visit.  This includes face to face time and non-face to face time preparing to see the patient (eg, review of tests), obtaining and/or reviewing separately obtained history, documenting clinical information in the electronic or other health record, independently interpreting results and communicating results to the patient/family/caregiver, or care coordinator.            RTC    Follow up in about 6 months (around 2023).      Yi Umaña, MSN NP      Collaborating Provider: Jarrell Kincaid MD, FAAN Neurologist/Epileptologist

## 2023-05-15 ENCOUNTER — TELEPHONE (OUTPATIENT)
Dept: NEUROLOGY | Facility: CLINIC | Age: 48
End: 2023-05-15
Payer: MEDICARE

## 2023-05-15 NOTE — PROGRESS NOTES
Labs Reviewed:    05-    VPA level 23.0 L ( 50 -100)     Reiterate compliance and no change to dose recommended

## 2023-05-15 NOTE — TELEPHONE ENCOUNTER
----- Message from Nancy Umaña NP sent at 5/15/2023  8:16 AM CDT -----  Labs Reviewed:    05-    VPA level 23.0 L ( 50 -100)     Reiterate compliance and no change to dose recommended

## 2023-06-07 ENCOUNTER — HOSPITAL ENCOUNTER (OUTPATIENT)
Dept: PULMONOLOGY | Facility: HOSPITAL | Age: 48
Discharge: HOME | End: 2023-06-07
Attending: NURSE PRACTITIONER
Payer: MEDICARE

## 2023-06-07 DIAGNOSIS — Q04.6 PORENCEPHALIC CYST: ICD-10-CM

## 2023-06-07 DIAGNOSIS — G40.909 SEIZURE DISORDER: ICD-10-CM

## 2023-06-07 DIAGNOSIS — F31.9 BIPOLAR AFFECTIVE DISORDER, REMISSION STATUS UNSPECIFIED: ICD-10-CM

## 2023-06-07 DIAGNOSIS — F81.9 COGNITIVE DEVELOPMENTAL DELAY: ICD-10-CM

## 2023-06-07 DIAGNOSIS — F39 MOOD DISORDER: ICD-10-CM

## 2023-06-07 PROCEDURE — 95819 EEG AWAKE AND ASLEEP: CPT

## 2023-06-07 PROCEDURE — 95819 EEG AWAKE AND ASLEEP: CPT | Mod: 26,,, | Performed by: PSYCHIATRY & NEUROLOGY

## 2023-06-07 PROCEDURE — 95819 PR EEG,W/AWAKE & ASLEEP RECORD: ICD-10-PCS | Mod: 26,,, | Performed by: PSYCHIATRY & NEUROLOGY

## 2023-06-20 NOTE — PROCEDURES
DATE EEG PERFORMED:  2023.      DATE EEG INTERPRETED:  2023.                   DURATION OF EE MINUTES.         LEVEL OF CONSCIOUSENESS    Awake and Sleep.         EEG BACKGROUND    There is no well-defined posterior dominant basic alpha rhythm.        EEG CLASSIFICATION    Continuous Slowing, Generalized, Theta Activity.          IMPRESSION      The EEG is suggestive of diffuse encephalopathy.         There are no epileptiform discharges or lateralizing signs. No typical events were recorded. There is no electrographic evidence of seizure.There is no electrographic evidence of status epilepticus.         PLEASE NOTE THAT A NON-EPILEPTIFORM EEG DOES NOT RULE OUT EPILEPSY.        JORDEN BARRAZA MD, FAAN    Diplomate, American Board of Psychiatry and Neurology    Diplomate, American Board of Clinical Neurophysiology

## 2023-07-14 RX ORDER — DIVALPROEX SODIUM 250 MG/1
250 TABLET, FILM COATED, EXTENDED RELEASE ORAL EVERY 12 HOURS
Qty: 180 TABLET | Refills: 3 | Status: SHIPPED | OUTPATIENT
Start: 2023-07-14 | End: 2023-07-19 | Stop reason: SDUPTHER

## 2023-07-14 NOTE — TELEPHONE ENCOUNTER
----- Message from Jessica Bourne sent at 7/14/2023  2:29 PM CDT -----  Contact: Benito/Vane Toussaint  Type:  RX Refill Request    Who Called: Benito  Refill or New Rx:refill  RX Name and Strength:divalproex ER (DEPAKOTE ER) 250 MG 24 hr tablet  How is the patient currently taking it? (ex. 1XDay):as prescribed  Is this a 30 day or 90 day RX:90  Preferred Pharmacy with phone number:  [facility] [place], [place] - [address]  [address]  [facility] [address]  Phone: [phone] Fax: [phone]  Local or Mail Order:local  Ordering Provider:N/a  Would the patient rather a call back or a response via [facility]? call  Best Call Back Number:[phone]  Additional Information: Has been informed provider Jolly Tolliver is no longer with [facility] and request to receive a prescription refill and have a prior authorization signed. Please give [facility] a call back to assist.   Thank you,  GH

## 2023-07-19 RX ORDER — DIVALPROEX SODIUM 250 MG/1
250 TABLET, FILM COATED, EXTENDED RELEASE ORAL EVERY 12 HOURS
Qty: 180 TABLET | Refills: 3 | Status: SHIPPED | OUTPATIENT
Start: 2023-07-19 | End: 2024-02-02 | Stop reason: SDUPTHER

## 2023-07-19 NOTE — TELEPHONE ENCOUNTER
----- Message from Markus Mccracken sent at 7/19/2023 10:58 AM CDT -----  Contact: [facility].  Benito Slaughter with [facility] is asking for an return call in reference to fax she is sending over for a new prescription for pt , please call back at [phone] Thx CJ

## 2023-08-01 ENCOUNTER — TELEPHONE (OUTPATIENT)
Dept: NEUROLOGY | Facility: CLINIC | Age: 48
End: 2023-08-01
Payer: MEDICARE

## 2023-08-01 NOTE — TELEPHONE ENCOUNTER
----- Message from Marlon Jimenez sent at 8/1/2023 10:56 AM CDT -----  Contact: Hom[facility]  Type:  Sooner Apoointment Request    Caller is requesting a sooner appointment.  Caller declined first available appointment listed below.  Caller will not accept being placed on the waitlist and is requesting a message be sent to doctor.  Name of Caller:[facility] [place]  When is the first available appointment?02/01/2024  Symptoms:sedated   Would the patient rather a call back or a [facility]? Call back   Best Call Back Number:[phone]  Additional Information:

## 2023-08-01 NOTE — TELEPHONE ENCOUNTER
Attempted to contact patient, no answer and unable to reach patient. Then called another phone number listed down below. Same number was unreachable.

## 2023-11-01 ENCOUNTER — HOSPITAL ENCOUNTER (EMERGENCY)
Facility: HOSPITAL | Age: 48
Discharge: HOME | End: 2023-11-01
Attending: EMERGENCY MEDICINE
Payer: MEDICARE

## 2023-11-01 VITALS
OXYGEN SATURATION: 98 % | SYSTOLIC BLOOD PRESSURE: 142 MMHG | BODY MASS INDEX: 21.01 KG/M2 | RESPIRATION RATE: 16 BRPM | TEMPERATURE: 98 F | WEIGHT: 128.19 LBS | HEART RATE: 63 BPM | DIASTOLIC BLOOD PRESSURE: 77 MMHG

## 2023-11-01 DIAGNOSIS — Z20.822 EXPOSURE TO COVID-19 VIRUS: Primary | ICD-10-CM

## 2023-11-01 PROCEDURE — 99281 EMR DPT VST MAYX REQ PHY/QHP: CPT

## 2023-11-01 NOTE — Clinical Note
"Claude "Claude" Sarchet was seen and treated in our emergency department on 11/1/2023.     COVID-19 is present in our communities across the state. There is limited testing for COVID at this time, so not all patients can be tested. In this situation, your employee meets the following criteria:    Claude Anthony Sarchet has expressed a desire/need to be tested for the COVID-19 virus but has none of the symptoms that one would associate with COVID-19. In the absence of symptoms, the patient does NOT meet the criteria for testing and should proceed with their work schedule as planned, following the routine infection control policies of the employer, as well as good hand hygiene.      If you have any questions or concerns, or if I can be of further assistance, please do not hesitate to contact me.    Sincerely,             Aroldo Sanders Jr., ANDREI"

## 2023-11-01 NOTE — ED PROVIDER NOTES
Encounter Date: 11/1/2023       History   No chief complaint on file.    48-year-old male presents emergency department after being exposed to COVID.  Patient currently denies any symptoms.    The history is provided by the patient.     Review of patient's allergies indicates:  No Known Allergies  Past Medical History:   Diagnosis Date    Porencephalic cyst 4/12/2016    Seizure     Seizure disorder 4/12/2016     No past surgical history on file.  No family history on file.  Social History     Tobacco Use    Smoking status: Never    Smokeless tobacco: Never   Substance Use Topics    Alcohol use: No    Drug use: No     Review of Systems   Constitutional:  Negative for fever.        +exposure to COVID   HENT:  Negative for sore throat.    Respiratory:  Negative for shortness of breath.    Cardiovascular:  Negative for chest pain.   Gastrointestinal:  Negative for nausea.   Genitourinary:  Negative for dysuria.   Musculoskeletal:  Negative for back pain.   Skin:  Negative for rash.   Neurological:  Negative for weakness.   Hematological:  Does not bruise/bleed easily.   All other systems reviewed and are negative.      Physical Exam     Initial Vitals   BP Pulse Resp Temp SpO2   -- -- -- -- --      MAP       --         Physical Exam    Constitutional: He appears well-developed and well-nourished.   Cardiovascular:  Normal rate.           Pulmonary/Chest: Breath sounds normal.   Musculoskeletal:         General: Normal range of motion.     Neurological: He is alert and oriented to person, place, and time.   Psychiatric: He has a normal mood and affect.         ED Course   Procedures  Labs Reviewed - No data to display       Imaging Results    None          Medications - No data to display  Medical Decision Making  Risk  Risk Details: Patient is asymptomatic, did not test for COVID                               Clinical Impression:   Final diagnoses:  [Z20.822] Exposure to COVID-19 virus (Primary)               Marilyn  Aroldo VU Jr., [facility]  11/01/23 [id number]

## 2024-01-22 ENCOUNTER — TELEPHONE (OUTPATIENT)
Dept: NEUROLOGY | Facility: CLINIC | Age: 49
End: 2024-01-22
Payer: MEDICARE

## 2024-01-22 DIAGNOSIS — F79 MENTALLY CHALLENGED: ICD-10-CM

## 2024-01-22 DIAGNOSIS — G40.909 SEIZURE DISORDER: ICD-10-CM

## 2024-01-22 DIAGNOSIS — F81.9 COGNITIVE DEVELOPMENTAL DELAY: ICD-10-CM

## 2024-01-22 DIAGNOSIS — R56.9 CONVULSIONS, UNSPECIFIED CONVULSION TYPE: Primary | ICD-10-CM

## 2024-01-22 NOTE — TELEPHONE ENCOUNTER
Attempted to contact patient, LVM to contact office.    impaired/Pt c/o double vision, haziness, can only see if item is placed on left side of face.

## 2024-01-23 ENCOUNTER — HOSPITAL ENCOUNTER (OUTPATIENT)
Dept: RADIOLOGY | Facility: HOSPITAL | Age: 49
Discharge: HOME | End: 2024-01-23
Attending: NURSE PRACTITIONER
Payer: MEDICARE

## 2024-01-23 ENCOUNTER — TELEPHONE (OUTPATIENT)
Dept: NEUROLOGY | Facility: CLINIC | Age: 49
End: 2024-01-23
Payer: MEDICARE

## 2024-01-23 DIAGNOSIS — F79 MENTALLY CHALLENGED: ICD-10-CM

## 2024-01-23 DIAGNOSIS — F81.9 COGNITIVE DEVELOPMENTAL DELAY: ICD-10-CM

## 2024-01-23 DIAGNOSIS — F31.9 BIPOLAR AFFECTIVE DISORDER, REMISSION STATUS UNSPECIFIED: ICD-10-CM

## 2024-01-23 DIAGNOSIS — G40.909 SEIZURE DISORDER: ICD-10-CM

## 2024-01-23 DIAGNOSIS — R56.9 CONVULSIONS, UNSPECIFIED CONVULSION TYPE: ICD-10-CM

## 2024-01-23 DIAGNOSIS — F39 MOOD DISORDER: ICD-10-CM

## 2024-01-23 DIAGNOSIS — Q04.6 PORENCEPHALIC CYST: ICD-10-CM

## 2024-01-23 PROCEDURE — 70450 CT HEAD/BRAIN W/O DYE: CPT | Mod: 26,,, | Performed by: RADIOLOGY

## 2024-01-23 PROCEDURE — 70450 CT HEAD/BRAIN W/O DYE: CPT | Mod: TC

## 2024-01-23 NOTE — PROGRESS NOTES
[facility] WO:         01-    No acute intracranial abnormality.    [place] left basal ganglia lacunar infarction.

## 2024-01-23 NOTE — TELEPHONE ENCOUNTER
Attempted to contact patient several times but each phone number on his chart did not work. I was not able to leave a  either.

## 2024-02-02 ENCOUNTER — OFFICE VISIT (OUTPATIENT)
Dept: NEUROLOGY | Facility: CLINIC | Age: 49
End: 2024-02-02
Payer: MEDICARE

## 2024-02-02 VITALS
HEART RATE: 68 BPM | HEIGHT: 66 IN | SYSTOLIC BLOOD PRESSURE: 143 MMHG | BODY MASS INDEX: 21.93 KG/M2 | RESPIRATION RATE: 16 BRPM | WEIGHT: 136.44 LBS | DIASTOLIC BLOOD PRESSURE: 89 MMHG

## 2024-02-02 DIAGNOSIS — R41.82 ALTERED MENTAL STATUS, UNSPECIFIED ALTERED MENTAL STATUS TYPE: ICD-10-CM

## 2024-02-02 DIAGNOSIS — F81.9 COGNITIVE DEVELOPMENTAL DELAY: Primary | ICD-10-CM

## 2024-02-02 DIAGNOSIS — G40.909 SEIZURE DISORDER: ICD-10-CM

## 2024-02-02 PROCEDURE — 99999 PR PBB SHADOW E&M-EST. PATIENT-LVL IV: CPT | Mod: PBBFAC,,, | Performed by: NURSE PRACTITIONER

## 2024-02-02 PROCEDURE — 99214 OFFICE O/P EST MOD 30 MIN: CPT | Mod: S$PBB,,, | Performed by: NURSE PRACTITIONER

## 2024-02-02 PROCEDURE — 99214 OFFICE O/P EST MOD 30 MIN: CPT | Mod: PBBFAC | Performed by: NURSE PRACTITIONER

## 2024-02-02 RX ORDER — PANTOPRAZOLE SODIUM 40 MG/1
40 TABLET, DELAYED RELEASE ORAL DAILY
COMMUNITY

## 2024-02-02 RX ORDER — DIVALPROEX SODIUM 250 MG/1
250 TABLET, FILM COATED, EXTENDED RELEASE ORAL EVERY 12 HOURS
Qty: 180 TABLET | Refills: 3 | Status: SHIPPED | OUTPATIENT
Start: 2024-02-02

## 2024-02-02 NOTE — PROGRESS NOTES
Subjective:      Patient ID: Claude Anthony Sarchet is a 48 y.o. male.    Chief Complaint:  Seizure disorder, Results, and Medication Refill        HPI    Former patient of Dr. Sanders, new to me.    Patient presents to appointment with his caretaker, Xena. Patient moved to a [facility] group home in [place] in February 2022. The purpose of this appointment is to established care with a neurologist in [place] for epilepsy while he is staying here temporarily. Xena knows very limited information on the patient's medical history; therefore, the phillip of the HPI is from EMR. Patient's medical history includes cognitive developmental delay, seizure disorder, porencephalic cyst, mood disorder, bipolar, and pancreatitis in 2015. Patient's baseline is oriented to self. He is able to completed ADLs with assistance. It is unclear when patient had his first or last seizure. Per chart review, patient had a possible seizure on 4- described as a 30 minute unresponsive episode. Per Dr. Sanders's note, patient has been seizure free for years. Xena states she is unaware of patient having a seizure since moving into the group home. He is currently taking Depakote  mg BID for epilepsy and behavioral problems. She denies him having adverse effects or tremors. Sees psych for mood disorder and bipolar. Does not drink alcohol. No known history of head injury or stroke. On 4-, MRI brain shows central white matter loss with prominence of the ventricular system. The anterior horn of the left ventricle enlarges into a porencephalic cyst, unchanged compared to head CT 8/2013. Saw neurosurgery who did not recommend surgical intervention. 3-3-2021 VPA 22L.     Patient is new to me, known to Jolly Tolliver NP. Patient is currently Carries Dx of Seizures, Cognitive developmental delay, Porencephalic cyst, Mood disorder, Bipolar.  Accompanied by caretaker, Viral. Patient continues to be a  resident of [facility]. Patient doing well.  Per chart review, patient had a possible seizure on 4- described as a 30 minute unresponsive episode. There is no mention of repeat occurrences. Viral reports no recent seizures. He has been a resident for the past 2 years with no issues or seizures reported.   He is currently taking Depakote  mg BID for epilepsy and behavioral problems. Patient also take Zyprexa 7.5 mg po daily managed by PCP Dr. Kirkland. 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 He states many times he want to go back to work. He states he enjoy watching TV at work, very kind, very repetitive, independent with dressing himself, he has to have supervision with ADLs such as bathing. The patient able to feed himself, he does light house work, simple instructions understood.              INTERVAL  HISTORY 02-: Patient is present for follow up he is doing well. He is currently taking Depakote  mg BID for epilepsy and behavioral problems. 05- VPA level 23.0 NL  Patient also take Zyprexa 7.5 mg po daily managed by PCP.    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 Patient is independent with ADLs, requires minimal assistance's.  The patient able to follow simple instructions understood.      MRI unable to obtain. [facility] WO:01- No acute intracranial abnormality. [place] left basal ganglia lacunar infarction. EEG Results 06- The EEG is suggestive of diffuse encephalopathy. No acute seizures.    Will order VPA level.       Review of Systems  Review of Systems   Unable to perform ROS: Psychiatric disorder     Objective:   VITAL SIGNS WERE REVIEWED        GENERAL APPEARANCE:      The patient looks comfortable.     BMI 19.91     No signs of respiratory distress.     Normal breathing pattern.     No dysmorphic features     No eye contact.      GENERAL MEDICAL EXAM:     HEENT:  Head is atraumatic  normocephalic.      Neck and Axillae: No JVD. No visible lesions. No thyromegaly. No lymphadenopathy.     Cardiopulmonary: No cyanosis. No tachypnea. Normal respiratory effort.     Gastrointestinal/Urogenital:  No jaundice. No stomas or lesions. No visible hernias. No catheters.     Skin, Hair and Nails:  No neurofibromatosis. No visible lesions.No stigmata of autoimmune disease. No clubbing.     Skin is warm and moist. No palpable masses.     Limbs: No varicose veins. No visible swelling. No palpable edema.     Muskoskeletal: No visible deformities.No visible lesions.     No spine tenderness. No signs of longstanding neuropathy. No dislocations or fractures.          Neurologic Exam     Mental Status   Disoriented to person: oriented first name only.   Disoriented to place.   Disoriented to time.   Follows 1 step commands.   Attention: decreased. Concentration: decreased.   Speech: speech is normal   Level of consciousness: alert    Cranial Nerves     CN II   Visual acuity: decreased    CN III, IV, VI   Pupils are equal, round, and reactive to light.  Right pupil: Size: 3 mm. Shape: regular. Reactivity: brisk. Consensual response: intact. Accommodation: intact.   Left pupil: Size: 3 mm. Shape: regular. Reactivity: brisk. Consensual response: intact. Accommodation: intact.   CN III: no CN III palsy  CN VI: no CN VI palsy  Nystagmus: none   Diplopia: none  Abnormal upgaze: Right eye upward deviation.  Downgaze: normal    CN V   Facial sensation intact.   Right facial sensation deficit: none  Left facial sensation deficit: none    CN VII   Facial expression full, symmetric.   Right facial weakness: none  Left facial weakness: none    CN VIII   CN VIII normal.   Hearing: intact    CN IX, X   CN IX normal.   CN X normal.   Palate: symmetric    CN XI   CN XI normal.   Right sternocleidomastoid strength: normal  Left sternocleidomastoid strength: normal  Right trapezius strength: normal  Left trapezius strength:  normal    CN XII   CN XII normal.   Tongue: not atrophic  Fasciculations: absent  Tongue deviation: none    Motor Exam   Muscle bulk: normal  Overall muscle tone: normal  Right arm tone: normal  Left arm tone: normal  Right arm pronator drift: absent  Left arm pronator drift: absent  Right leg tone: normal  Left leg tone: normal  Had difficulty following commands. Appears to move all extremities equally and strong      Sensory Exam     Unable to assess     Gait, Coordination, and Reflexes     Gait  Gait: normal    Coordination   Finger to nose coordination: normal    Tremor   Resting tremor: absent  Intention tremor: absent  Action tremor: left arm and right arm    Reflexes   Right brachioradialis: 2+  Left brachioradialis: 2+  Right biceps: 2+  Left biceps: 2+  Right triceps: 2+  Left triceps: 2+  Right patellar: 2+  Left patellar: 2+  Right achilles: 2+  Left achilles: 2+  Right plantar: normal  Left plantar: normal  Right Bright: absent  Left Bright: absent  Right ankle clonus: absent  Left ankle clonus: absent  Right pendular knee jerk: absent  Left pendular knee jerk: absent          Lab Results   Component Value Date    WBC 4.6 02/16/2023    HGB 11.0 (L) 02/16/2023    HCT 33.8 (L) 02/16/2023    MCV 91 09/13/2022     02/16/2023       Sodium   Date Value Ref Range Status   09/13/2022 147 (H) 134 - 144 mmol/L Final     Potassium   Date Value Ref Range Status   09/13/2022 3.8 3.5 - 5.2 mmol/L Final     Chloride   Date Value Ref Range Status   09/13/2022 110 (H) 96 - 106 mmol/L Final     CO2   Date Value Ref Range Status   09/13/2022 23 20 - 29 mmol/L Final     Glucose   Date Value Ref Range Status   09/13/2022 120 (H) 65 - 99 mg/dL Final     BUN   Date Value Ref Range Status   09/13/2022 16 6 - 24 mg/dL Final     Creatinine   Date Value Ref Range Status   09/13/2022 0.92 0.76 - 1.27 mg/dL Final     Calcium   Date Value Ref Range Status   09/13/2022 9.6 8.7 - 10.2 mg/dL Final     Total Protein   Date Value  Ref Range Status   04/12/2016 6.9 6.0 - 8.4 g/dL Final     Albumin   Date Value Ref Range Status   09/13/2022 4.1 4.0 - 5.0 g/dL Final   04/12/2016 4.3 3.5 - 5.2 g/dL Final     Total Bilirubin   Date Value Ref Range Status   09/13/2022 0.5 0.0 - 1.2 mg/dL Final     Alkaline Phosphatase   Date Value Ref Range Status   04/12/2016 60 55 - 135 U/L Final     AST   Date Value Ref Range Status   09/13/2022 28 0 - 40 IU/L Final     ALT   Date Value Ref Range Status   09/13/2022 27 0 - 44 IU/L Final     Anion Gap   Date Value Ref Range Status   04/13/2016 7 (L) 8 - 16 mmol/L Final     eGFR if    Date Value Ref Range Status   04/13/2016 >60.0 >60 mL/min/1.73 m^2 Final     eGFR if non    Date Value Ref Range Status   04/13/2016 >60.0 >60 mL/min/1.73 m^2 Final     Comment:     Calculation used to obtain the estimated glomerular filtration  rate (eGFR) is the CKD-EPI equation. Since race is unknown   in our information system, the eGFR values for   -American and Non--American patients are given   for each creatinine result.       AED MONITORING    AED: Valproic acid RANGE:  Dose    DATE LEVEL    3-3-2021 22.7L 250 mg BID ER   05- 23.0 L  250 mg BID ER                               RADIOLOGY EVALUATION:     08-    CTH shows a cystic structure emanating off the anterior horn of the left lateral ventricle. There is ventricular dilation. Otherwise, this is a normal CT of the brain    04-    MRI brain shows central white matter loss with prominence of the ventricular system. The anterior horn of the left ventricle enlarges into a porencephalic cyst, unchanged compared to head CT 8/2013      CTH WO:     01-    No acute intracranial abnormality.     [place] left basal ganglia lacunar infarction.    Reviewed the neuroimaging independently         NEUROPHYSIOLOGY EVALUATION:     09-    EEG Non-epileptiform, Technically Difficult     06-      EEG Results     The EEG is suggestive of diffuse encephalopathy. No acute seizures.      Assessment:     1. Cognitive developmental delay    2. Altered mental status, unspecified altered mental status type    3. Seizure disorder             EPILEPSY CLASSIFICATION        SEMIOLOGY: UNKOWN     EPILEPTOGENIC ZONE (S): UNKNOWN      ETIOLOGY: PORENCEPHALIC CYST     PRIOR AEDS:  UNKOWN    CURRENT AEDS: VPA     LAST SEIZURE DATE: UNKNOWN        COMPREHENSIVE LIST OF AEDs:      Acetazolamide (AZM-Diamox)   Benzos: clonazepam (CZP Klonopin), lorazepam (LZP-Ativan), diazepam (DZP-Valium), clorazepate (CLZ- Tranxene)  Brivaracetam (BRV-Briviact)  Cannabidiol (CBD- Epidiolex)  Carbamazepine (CBZ-Tegretol)  Cenobamate (CNB-Xcopri)  Clobazam (CLB-Onfi)  Eslicarbazepine (ESL-Aptiom)  Ethosuximide (ESX-Zarontin)  Felbamate (FBM-Felbatol)  Gabapentin (GBP-Neurontin)  Lacosamide (LCM-Vimpat)  Lamotrigine (LTG-Lamitcal)  Levetiracetam (LEV- Keppra)  Oxcarbazepine (OXC-Trileptal)  Perampanel (PML-Fycompa)  Phenobarbital (PB)  Phenytoin (PHT-Dilantin)  Pregabalin (PGB-Lyrica)  Primidone (PRM)   Retigabine (RTG- Potiga) Discontinued in 2017  Rufinamide (RFN-Benzil)  Stiripentol (STP-Diacomit)  Tiagabine (TGB-Gabitril)  Topiramate (TPM-Topamax)  Valproate (VPA-Depakote)  Vigabatrin (VGB-Sabril)  Zonisamide (ZNS-Zonegran)  Plan:     SEIZURE DISORDER/  MENTALLY CHALLENGED WITH BEHAVIORS  Evaluate:     The patient was encouraged to maintain full traditional seizure precautions which include but not limited to avoid driving, avoid high altitudes, avoid being close to fire or fire source, avoid being close to a body of water or swimming alone, avoid operating heavy machinery and avoid using sharp objects if possible. The patient was encouraged to shower (without accumulation of water) instead of taking a bath if unsupervised. The patient was made aware that these precautions are especially important during concurrent illness. Adequate  sleep and avoidance of alcohol as important measures to assure good seizure control were discussed with the patient. The patient was also advised not to care for children without company. The patient was advised to pad the side rails with pillows and blankets if applicable.I strongly recommended lowering the bed to the floor level to decrease the risk of falls.     Continue VPA  mg BID and check level ( seizures and mood behaviors) refills sent    Check VPA level in 3 months     Continue AEDs INDEFINITELY, FOR GOOD AND NEVER SKIP A DOSE. The patient verbalized full understanding. Stressed the extreme medical, personal safety, public safety and legal importance of compliance and seizure control. Will give as many refills as possible with or without face-to-face evaluation to make sure the patient and any patient with epilepsy will never run out of medications. Running out of seizure medications should never happen under our care. I explained to the patient that he should not, under any circumstances, adjust or stop taking his seizure medication without discussing with me. The patient verbalized full understanding.       Explained to the patient that if 2 AEDs fail to control the seizures the likelihood of AEDs alone to control the seizures is <10% and other other options should be pursued.    May consider Adjuvant Verapamil and Prednisone in medically-refractory epilepsy.         AVOID any substance that could lower seizure threshold including but not limited to:      ALCOHOL AND WITHDRAWAL      TRAMADOL.     DEMEROL      ALL STIMULANTS-ALL ADHD MEDICATIONS.      CLOZAPINE.      BUPROPION.     CIPROFLOXACIN          SEIZURE FREEDOM DEFINITION: No seizures for 1 year or for 3 times the interval between the last 2 seizures whichever is longer (Some studies suggest 6 times even)!          PORENCEPHALIC CYST    Clinically montior if Neuro exam changes        BIPLOAR/MOOD DISORDER    Continue to follow psych        HISTORY OF LEFT BASAL GANGLIA LACUNAR STROKE     Vascular Risk Factors (VRFs) stratification (BP, BS, BC control and Smoking Cessation) is the mainstay of stroke prevention (>90%). The benefit of antiplatelets is < 20% stroke risk reduction.         Healthy diet and exercise    Avoid driving.    Call 911 if any SUDDEN:   Weakness  Numbness  Slurring of speech  Speech difficulty   Vertigo  Loss of balance  Loss of vision  Loss of hearing  Double vision  Trouble swallowing  Trouble breathing  Facial drooping        Modified Slade Score (m-RS)      0  The patient has no residual symptoms.    1  The patient has no significant disability; able to carry out all pre-stroke activities.    2  The patient has slight disability; unable to carry out all pre-stroke activities but able to look after self without daily help.    3  The patient has moderate disability; requiring some external help but able to walk without the assistance of another individual.    4  The patient has moderately severe disability; unable to walk or attend to bodily functions without assistance of another individual.    5  The patient has severe disability; bedridden, incontinent, requires continuous care.    6  The patient has  (during the hospital stay or after discharge from the hospital).      MEDICAL/SURGICAL COMORBIDITIES      All relevant medical comorbidities noted and managed by primary care physician and medical care team.          MISCELLANEOUS MEDICAL PROBLEMS         HEALTHY LIFESTYLE AND PREVENTATIVE CARE     Encouraged the patient to adhere to the age-appropriate health maintenance guidelines including screening tests and vaccinations.      Discussed the overall importance of healthy lifestyle, optimal weight, exercise, healthy diet, good sleep hygiene and avoiding drugs including smoking, alcohol and recreational drugs. The patient verbalized full understanding.         Advised the patient to follow COVID-19 prevention  measures.         I spent a total of 30 minutes on the day of the visit.  This includes face to face time and non-face to face time preparing to see the patient (eg, review of tests), obtaining and/or reviewing separately obtained history, documenting clinical information in the electronic or other health record, independently interpreting results and communicating results to the patient/family/caregiver, or care coordinator.            RTC 11 months       Follow up in about 11 months (around 1/2/2025).      Yi Umaña, MSN NP      Collaborating Provider: Jarrell Kincaid MD, FAAN Neurologist/Epileptologist

## 2024-02-21 DIAGNOSIS — F39 MOOD DISORDER: ICD-10-CM

## 2024-02-21 DIAGNOSIS — R41.82 ALTERED MENTAL STATUS, UNSPECIFIED ALTERED MENTAL STATUS TYPE: Primary | ICD-10-CM

## 2024-02-21 DIAGNOSIS — A41.9 SEPSIS, DUE TO UNSPECIFIED ORGANISM, UNSPECIFIED WHETHER ACUTE ORGAN DYSFUNCTION PRESENT: ICD-10-CM

## 2024-12-09 ENCOUNTER — TELEPHONE (OUTPATIENT)
Dept: NEUROLOGY | Facility: CLINIC | Age: 49
End: 2024-12-09
Payer: MEDICARE

## 2024-12-09 NOTE — TELEPHONE ENCOUNTER
----- Message from Rika sent at 12/9/2024  4:08 PM CST -----  Contact: Jake/[facility]  Mrs. Joseph is calling in regards to the pt being admitted on 12/08 in [facility] for seizure.please call back at [phone]      Thanks  MAILE

## 2024-12-09 NOTE — TELEPHONE ENCOUNTER
Pt was admitted on 12/8 for focal seizures to [facility] she will call us to schedule and appt once he is discharged .

## 2024-12-26 ENCOUNTER — TELEPHONE (OUTPATIENT)
Dept: NEUROLOGY | Facility: CLINIC | Age: 49
End: 2024-12-26
Payer: MEDICARE

## 2024-12-26 NOTE — TELEPHONE ENCOUNTER
----- Message from Ana sent at 12/24/2024 12:40 PM CST -----  Regarding: Appointment  Contact: Sophie, caregiver  Per phone call with patient, she stated that he was discharge from the hospital and is now needing to schedule a appointment for a hospital follow up.  Please return call at [phone].    Thanks,  SJ

## 2025-01-23 ENCOUNTER — TELEPHONE (OUTPATIENT)
Dept: NEUROLOGY | Facility: CLINIC | Age: 50
End: 2025-01-23
Payer: MEDICARE

## 2025-02-24 ENCOUNTER — OFFICE VISIT (OUTPATIENT)
Dept: NEUROLOGY | Facility: CLINIC | Age: 50
End: 2025-02-24
Payer: MEDICARE

## 2025-02-24 ENCOUNTER — LAB VISIT (OUTPATIENT)
Dept: LAB | Facility: HOSPITAL | Age: 50
End: 2025-02-24
Attending: NURSE PRACTITIONER
Payer: MEDICARE

## 2025-02-24 VITALS
BODY MASS INDEX: 22.6 KG/M2 | SYSTOLIC BLOOD PRESSURE: 133 MMHG | DIASTOLIC BLOOD PRESSURE: 82 MMHG | HEART RATE: 79 BPM | RESPIRATION RATE: 16 BRPM | HEIGHT: 66 IN | WEIGHT: 140.63 LBS

## 2025-02-24 DIAGNOSIS — A41.9 SEPSIS, DUE TO UNSPECIFIED ORGANISM, UNSPECIFIED WHETHER ACUTE ORGAN DYSFUNCTION PRESENT: ICD-10-CM

## 2025-02-24 DIAGNOSIS — F39 MOOD DISORDER: ICD-10-CM

## 2025-02-24 DIAGNOSIS — R56.9 SEIZURE: ICD-10-CM

## 2025-02-24 DIAGNOSIS — F79 MENTALLY CHALLENGED: ICD-10-CM

## 2025-02-24 DIAGNOSIS — G40.909 SEIZURE DISORDER: ICD-10-CM

## 2025-02-24 DIAGNOSIS — R41.82 ALTERED MENTAL STATUS, UNSPECIFIED ALTERED MENTAL STATUS TYPE: ICD-10-CM

## 2025-02-24 DIAGNOSIS — F81.9 COGNITIVE DEVELOPMENTAL DELAY: Primary | ICD-10-CM

## 2025-02-24 PROCEDURE — 80177 DRUG SCRN QUAN LEVETIRACETAM: CPT | Performed by: NURSE PRACTITIONER

## 2025-02-24 PROCEDURE — 99214 OFFICE O/P EST MOD 30 MIN: CPT | Mod: PBBFAC | Performed by: NURSE PRACTITIONER

## 2025-02-24 PROCEDURE — 80164 ASSAY DIPROPYLACETIC ACD TOT: CPT | Performed by: NURSE PRACTITIONER

## 2025-02-24 PROCEDURE — 99999 PR PBB SHADOW E&M-EST. PATIENT-LVL IV: CPT | Mod: PBBFAC,,, | Performed by: NURSE PRACTITIONER

## 2025-02-24 RX ORDER — LEVETIRACETAM 1000 MG/1
1000 TABLET ORAL
COMMUNITY
Start: 2024-12-31 | End: 2025-02-24 | Stop reason: SDUPTHER

## 2025-02-24 RX ORDER — TAMSULOSIN HYDROCHLORIDE 0.4 MG/1
CAPSULE ORAL
COMMUNITY
Start: 2025-02-17

## 2025-02-24 RX ORDER — LEVOTHYROXINE SODIUM 112 UG/1
112 TABLET ORAL
COMMUNITY
Start: 2025-02-07

## 2025-02-24 RX ORDER — LEVETIRACETAM 1000 MG/1
1000 TABLET ORAL 2 TIMES DAILY
Qty: 60 TABLET | Refills: 11 | Status: SHIPPED | OUTPATIENT
Start: 2025-02-24 | End: 2026-02-24

## 2025-02-24 NOTE — PROGRESS NOTES
Subjective:      Patient ID: Claude Anthony Sarchet is a 49 y.o. male.    Chief Complaint:  Cognitive developmental delay        HPI    Former patient of Dr. Sanders, new to me.    Patient presents to appointment with his caretaker, Xena. Patient moved to a [facility] group home in [place] in February 2022. The purpose of this appointment is to established care with a neurologist in [place] for epilepsy while he is staying here temporarily. Xena knows very limited information on the patient's medical history; therefore, the phillip of the HPI is from EMR. Patient's medical history includes cognitive developmental delay, seizure disorder, porencephalic cyst, mood disorder, bipolar, and pancreatitis in 2015. Patient's baseline is oriented to self. He is able to completed ADLs with assistance. It is unclear when patient had his first or last seizure. Per chart review, patient had a possible seizure on 4- described as a 30 minute unresponsive episode. Per Dr. Sanders's note, patient has been seizure free for years. Xena states she is unaware of patient having a seizure since moving into the group home. He is currently taking Depakote  mg BID for epilepsy and behavioral problems. She denies him having adverse effects or tremors. Sees psych for mood disorder and bipolar. Does not drink alcohol. No known history of head injury or stroke. On 4-, MRI brain shows central white matter loss with prominence of the ventricular system. The anterior horn of the left ventricle enlarges into a porencephalic cyst, unchanged compared to head CT 8/2013. Saw neurosurgery who did not recommend surgical intervention. 3-3-2021 VPA 22L.     Patient is new to me, known to Jolly Tolliver NP. Patient is currently Carries Dx of Seizures, Cognitive developmental delay, Porencephalic cyst, Mood disorder, Bipolar.  Accompanied by caretaker, Viral. Patient continues to be a resident of [facility]  home. Patient doing well.  Per chart review, patient had a possible seizure on 4- described as a 30 minute unresponsive episode. There is no mention of repeat occurrences. Viral reports no recent seizures. He has been a resident for the past 2 years with no issues or seizures reported.   He is currently taking Depakote  mg BID for epilepsy and behavioral problems. Patient also take Zyprexa 7.5 mg po daily managed by PCP Dr. Kirkland. 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 He states many times he want to go back to work. He states he enjoy watching TV at work, very kind, very repetitive, independent with dressing himself, he has to have supervision with ADLs such as bathing. The patient able to feed himself, he does light house work, simple instructions understood.    MRI unable to obtain. [facility] WO:01- No acute intracranial abnormality. [place] left basal ganglia lacunar infarction. EEG Results 06- The EEG is suggestive of diffuse encephalopathy. No acute seizures.               INTERVAL HISTORY 02-: Patient is present for hospital follow up. Patient is a [employer]. He no longer taking Depakote  mg BID for epilepsy and behavioral problems he began having increased focal seizures. The treatment was titrated several times but he continued to have AMS and increased focal seizures. Patient was revaluated and treated at [facility] he stopped in 12-. He was changed to Keppra 1000 mg BID. The patient has not had any other seizures since starting treatment. Will order LEV level.  Patient is back to [facility]. Patient also take Zyprexa 7.5 mg po daily managed by PCP.    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 Patient is independent with ADLs, requires minimal assistance's.  The patient able to follow simple instructions understood.          Review of Systems   Unable to perform ROS: Psychiatric disorder      Objective:   VITAL SIGNS WERE REVIEWED        GENERAL APPEARANCE:      The patient looks comfortable.     BMI 23.05 KG     No signs of respiratory distress.     Normal breathing pattern.     No dysmorphic features     No eye contact.      GENERAL MEDICAL EXAM:     HEENT:  Head is atraumatic normocephalic.      Neck and Axillae: No JVD. No visible lesions. No thyromegaly. No lymphadenopathy.     Cardiopulmonary: No cyanosis. No tachypnea. Normal respiratory effort.     Gastrointestinal/Urogenital:  No jaundice. No stomas or lesions. No visible hernias. No catheters.     Skin, Hair and Nails:  No neurofibromatosis. No visible lesions.No stigmata of autoimmune disease. No clubbing.     Skin is warm and moist. No palpable masses.     Limbs: No varicose veins. No visible swelling. No palpable edema.     Muskoskeletal: No visible deformities.No visible lesions.     No spine tenderness. No signs of longstanding neuropathy. No dislocations or fractures.          Neurologic Exam     Mental Status   Disoriented to person: oriented first name only.   Disoriented to place.   Disoriented to time.   Follows 1 step commands.   Attention: decreased. Concentration: decreased.   Speech: speech is normal   Level of consciousness: alert    Cranial Nerves     CN II   Visual acuity: decreased    CN III, IV, VI   Pupils are equal, round, and reactive to light.  Right pupil: Size: 3 mm. Shape: regular. Reactivity: brisk. Consensual response: intact. Accommodation: intact.   Left pupil: Size: 3 mm. Shape: regular. Reactivity: brisk. Consensual response: intact. Accommodation: intact.   CN III: no CN III palsy  CN VI: no CN VI palsy  Nystagmus: none   Diplopia: none  Abnormal upgaze: Right eye upward deviation.  Downgaze: normal    CN V   Facial sensation intact.   Right facial sensation deficit: none  Left facial sensation deficit: none    CN VII   Facial expression full, symmetric.   Right facial weakness: none  Left facial weakness:  none    CN VIII   CN VIII normal.   Hearing: intact    CN IX, X   CN IX normal.   CN X normal.   Palate: symmetric    CN XI   CN XI normal.   Right sternocleidomastoid strength: normal  Left sternocleidomastoid strength: normal  Right trapezius strength: normal  Left trapezius strength: normal    CN XII   CN XII normal.   Tongue: not atrophic  Fasciculations: absent  Tongue deviation: none    Motor Exam   Muscle bulk: normal  Overall muscle tone: normal  Right arm tone: normal  Left arm tone: normal  Right arm pronator drift: absent  Left arm pronator drift: absent  Right leg tone: normal  Left leg tone: normal  Had difficulty following commands. Appears to move all extremities equally and strong      Sensory Exam     Unable to assess     Gait, Coordination, and Reflexes     Gait  Gait: normal    Coordination   Finger to nose coordination: normal    Tremor   Resting tremor: absent  Intention tremor: absent  Action tremor: left arm and right arm    Reflexes   Right brachioradialis: 2+  Left brachioradialis: 2+  Right biceps: 2+  Left biceps: 2+  Right triceps: 2+  Left triceps: 2+  Right patellar: 2+  Left patellar: 2+  Right achilles: 2+  Left achilles: 2+  Right plantar: normal  Left plantar: normal  Right Bright: absent  Left Bright: absent  Right ankle clonus: absent  Left ankle clonus: absent  Right pendular knee jerk: absent  Left pendular knee jerk: absent          Lab Results   Component Value Date    WBC 7.3 07/24/2024    HGB 12.9 (L) 07/24/2024    HCT 38.8 07/24/2024    MCV 91 09/13/2022     07/24/2024       Sodium   Date Value Ref Range Status   09/13/2022 147 (H) 134 - 144 mmol/L Final     Potassium   Date Value Ref Range Status   09/13/2022 3.8 3.5 - 5.2 mmol/L Final     Chloride   Date Value Ref Range Status   09/13/2022 110 (H) 96 - 106 mmol/L Final     CO2   Date Value Ref Range Status   09/13/2022 23 20 - 29 mmol/L Final     Glucose   Date Value Ref Range Status   09/13/2022 120 (H) 65 - 99  mg/dL Final     BUN   Date Value Ref Range Status   09/13/2022 16 6 - 24 mg/dL Final     Creatinine   Date Value Ref Range Status   09/13/2022 0.92 0.76 - 1.27 mg/dL Final     Calcium   Date Value Ref Range Status   09/13/2022 9.6 8.7 - 10.2 mg/dL Final     Total Protein   Date Value Ref Range Status   04/12/2016 6.9 6.0 - 8.4 g/dL Final     Albumin   Date Value Ref Range Status   09/13/2022 4.1 4.0 - 5.0 g/dL Final   04/12/2016 4.3 3.5 - 5.2 g/dL Final     Total Bilirubin   Date Value Ref Range Status   09/13/2022 0.5 0.0 - 1.2 mg/dL Final     Alkaline Phosphatase   Date Value Ref Range Status   04/12/2016 60 55 - 135 U/L Final     AST   Date Value Ref Range Status   09/13/2022 28 0 - 40 IU/L Final     ALT   Date Value Ref Range Status   09/13/2022 27 0 - 44 IU/L Final     Anion Gap   Date Value Ref Range Status   04/13/2016 7 (L) 8 - 16 mmol/L Final     eGFR if    Date Value Ref Range Status   04/13/2016 >60.0 >60 mL/min/1.73 m^2 Final     eGFR if non    Date Value Ref Range Status   04/13/2016 >60.0 >60 mL/min/1.73 m^2 Final     Comment:     Calculation used to obtain the estimated glomerular filtration  rate (eGFR) is the CKD-EPI equation. Since race is unknown   in our information system, the eGFR values for   -American and Non--American patients are given   for each creatinine result.       AED MONITORING    AED: Valproic acid RANGE:  Dose    DATE LEVEL    3-3-2021 22.7L 250 mg BID ER   05- 23.0 L  250 mg BID ER   02- 12.5 L                           AED:  NORMAL LEVEL RANGE:  DOSE   DATE LEVEL    02- 29.2 NL  1000 MG BID                                     RADIOLOGY EVALUATION:     08-    CTH shows a cystic structure emanating off the anterior horn of the left lateral ventricle. There is ventricular dilation. Otherwise, this is a normal CT of the brain    04-    MRI brain shows central white matter loss with prominence of  the ventricular system. The anterior horn of the left ventricle enlarges into a porencephalic cyst, unchanged compared to head CT 8/2013      [facility] WO:     01-    No acute intracranial abnormality.     [place] left basal ganglia lacunar infarction.    Reviewed the neuroimaging independently         NEUROPHYSIOLOGY EVALUATION:     09-    EEG Non-epileptiform, Technically Difficult     06-     EEG Results     The EEG is suggestive of diffuse encephalopathy. No acute seizures.      Assessment:     1. Cognitive developmental delay    2. Altered mental status, unspecified altered mental status type    3. Seizure disorder    4. Seizure    5. Mood disorder    6. Sepsis, due to unspecified organism, unspecified whether acute organ dysfunction present    7. Mentally challenged               EPILEPSY CLASSIFICATION        SEMIOLOGY: UNKOWN     EPILEPTOGENIC ZONE (S): UNKNOWN      ETIOLOGY: PORENCEPHALIC CYST     PRIOR AEDS:  UNKOWN    CURRENT AEDS: VPA     LAST SEIZURE DATE: UNKNOWN        COMPREHENSIVE LIST OF AEDs:      Acetazolamide (AZM-Diamox)   Benzos: clonazepam (CZP Klonopin), lorazepam (LZP-Ativan), diazepam (DZP-Valium), clorazepate (CLZ- Tranxene)  Brivaracetam (BRV-Briviact)  Cannabidiol (CBD- Epidiolex)  Carbamazepine (CBZ-Tegretol)  Cenobamate (CNB-Xcopri)  Clobazam (CLB-Onfi)  Eslicarbazepine (ESL-Aptiom)  Ethosuximide (ESX-Zarontin)  Felbamate (FBM-Felbatol)  Gabapentin (GBP-Neurontin)  Lacosamide (LCM-Vimpat)  Lamotrigine (LTG-Lamitcal)  Levetiracetam (LEV- Keppra)  Oxcarbazepine (OXC-Trileptal)  Perampanel (PML-Fycompa)  Phenobarbital (PB)  Phenytoin (PHT-Dilantin)  Pregabalin (PGB-Lyrica)  Primidone (PRM)   Retigabine (RTG- Potiga) Discontinued in 2017  Rufinamide (RFN-Benzil)  Stiripentol (STP-Diacomit)  Tiagabine (TGB-Gabitril)  Topiramate (TPM-Topamax)  Valproate (VPA-Depakote)  Vigabatrin (VGB-Sabril)  Zonisamide (ZNS-Zonegran)  Plan:     SEIZURE DISORDER/  MENTALLY CHALLENGED WITH  BEHAVIORS      The patient was encouraged to maintain full traditional seizure precautions which include but not limited to avoid driving, avoid high altitudes, avoid being close to fire or fire source, avoid being close to a body of water or swimming alone, avoid operating heavy machinery and avoid using sharp objects if possible. The patient was encouraged to shower (without accumulation of water) instead of taking a bath if unsupervised. The patient was made aware that these precautions are especially important during concurrent illness. Adequate sleep and avoidance of alcohol as important measures to assure good seizure control were discussed with the patient. The patient was also advised not to care for children without company. The patient was advised to pad the side rails with pillows and blankets if applicable.I strongly recommended lowering the bed to the floor level to decrease the risk of falls.     Continue KEPPRA 1000 MG PO BID (BRAND DRUG ONLY)     Check Keppra level today     Failed  MG PO BID       Continue AEDs INDEFINITELY, FOR GOOD AND NEVER SKIP A DOSE. The patient verbalized full understanding. Stressed the extreme medical, personal safety, public safety and legal importance of compliance and seizure control. Will give as many refills as possible with or without face-to-face evaluation to make sure the patient and any patient with epilepsy will never run out of medications. Running out of seizure medications should never happen under our care. I explained to the patient that he should not, under any circumstances, adjust or stop taking his seizure medication without discussing with me. The patient verbalized full understanding.       Explained to the patient that if 2 AEDs fail to control the seizures the likelihood of AEDs alone to control the seizures is <10% and other other options should be pursued.    May consider Adjuvant Verapamil and Prednisone in medically-refractory epilepsy.          AVOID any substance that could lower seizure threshold including but not limited to:      ALCOHOL AND WITHDRAWAL      TRAMADOL.     DEMEROL      ALL STIMULANTS-ALL ADHD MEDICATIONS.      CLOZAPINE.      BUPROPION.     CIPROFLOXACIN          SEIZURE FREEDOM DEFINITION: No seizures for 1 year or for 3 times the interval between the last 2 seizures whichever is longer (Some studies suggest 6 times even)!          PORENCEPHALIC CYST    Clinically montior if Neuro exam changes        BIPLOAR/MOOD DISORDER    Continue to follow psych       HISTORY OF LEFT BASAL GANGLIA LACUNAR STROKE     Vascular Risk Factors (VRFs) stratification (BP, BS, BC control and Smoking Cessation) is the mainstay of stroke prevention (>90%). The benefit of antiplatelets is < 20% stroke risk reduction.         Healthy diet and exercise    Avoid driving.    Call 911 if any SUDDEN:   Weakness  Numbness  Slurring of speech  Speech difficulty   Vertigo  Loss of balance  Loss of vision  Loss of hearing  Double vision  Trouble swallowing  Trouble breathing  Facial drooping        Modified Concordia Score (m-RS)      0  The patient has no residual symptoms.    1  The patient has no significant disability; able to carry out all pre-stroke activities.    2  The patient has slight disability; unable to carry out all pre-stroke activities but able to look after self without daily help.    3  The patient has moderate disability; requiring some external help but able to walk without the assistance of another individual.    4  The patient has moderately severe disability; unable to walk or attend to bodily functions without assistance of another individual.    5  The patient has severe disability; bedridden, incontinent, requires continuous care.    6  The patient has  (during the hospital stay or after discharge from the hospital).      MEDICAL/SURGICAL COMORBIDITIES      All relevant medical comorbidities noted and managed by primary care  physician and medical care team.          MISCELLANEOUS MEDICAL PROBLEMS         HEALTHY LIFESTYLE AND PREVENTATIVE CARE     Encouraged the patient to adhere to the age-appropriate health maintenance guidelines including screening tests and vaccinations.      Discussed the overall importance of healthy lifestyle, optimal weight, exercise, healthy diet, good sleep hygiene and avoiding drugs including smoking, alcohol and recreational drugs. The patient verbalized full understanding.         Advised the patient to follow COVID-19 prevention measures.         I spent a total of 30 minutes on the day of the visit.  This includes face to face time and non-face to face time preparing to see the patient (eg, review of tests), obtaining and/or reviewing separately obtained history, documenting clinical information in the electronic or other health record, independently interpreting results and communicating results to the patient/family/caregiver, or care coordinator.            RTC 6 months       No follow-ups on file.      Yi Umaña, MSN NP      Collaborating Provider: Jarrell Kincaid MD, FAAN Neurologist/Epileptologist

## 2025-02-25 LAB — VALPROATE SERPL-MCNC: <12.5 UG/ML (ref 50–100)

## 2025-02-27 LAB — LEVETIRACETAM SERPL-MCNC: 29.2 UG/ML (ref 3–60)

## 2025-02-28 ENCOUNTER — RESULTS FOLLOW-UP (OUTPATIENT)
Dept: NEUROLOGY | Facility: CLINIC | Age: 50
End: 2025-02-28

## 2025-04-23 ENCOUNTER — TELEPHONE (OUTPATIENT)
Dept: NEUROLOGY | Facility: CLINIC | Age: 50
End: 2025-04-23
Payer: MEDICARE

## 2025-04-23 NOTE — TELEPHONE ENCOUNTER
Spoke with patient nursing coordinator in regards to following up with pt Rx. Stated she was going to check and then give me  a call back. I did verbalized understanding.

## 2025-04-23 NOTE — TELEPHONE ENCOUNTER
----- Message from Mali sent at 4/21/2025 10:46 AM CDT -----  Contact: [phone]  1MEDICALADVICE Patient is calling for Medical Advice regarding:Follow up on fax Patient wants a call back or [facility]:Call back Comments:[facility] would like a call back from nurse regarding a fax that was sent over 04/18.Please advise patient replies from provider may take up to 48 hours.

## 2025-04-23 NOTE — TELEPHONE ENCOUNTER
----- Message from Karla sent at 4/23/2025  9:28 AM CDT -----  Contact: [phone] @ [facility] (Nurse)  .1MEDICALADVICE Patient is calling for Medical Advice regarding:Good Morning, Nurse want Karina Velazquez MA know patient did not take Rx because he need name brand . Rx is need a PAHow long has patient had these symptoms:Pharmacy name and phone#:Patient wants a call back or [facility]:Comments:Please advise patient replies from provider may take up to 48 hours.